# Patient Record
Sex: FEMALE | Race: BLACK OR AFRICAN AMERICAN | NOT HISPANIC OR LATINO | Employment: FULL TIME | URBAN - METROPOLITAN AREA
[De-identification: names, ages, dates, MRNs, and addresses within clinical notes are randomized per-mention and may not be internally consistent; named-entity substitution may affect disease eponyms.]

---

## 2017-05-22 ENCOUNTER — HOSPITAL ENCOUNTER (EMERGENCY)
Facility: HOSPITAL | Age: 32
Discharge: HOME | End: 2017-05-22
Attending: EMERGENCY MEDICINE
Payer: MEDICAID

## 2017-05-22 VITALS
RESPIRATION RATE: 15 BRPM | DIASTOLIC BLOOD PRESSURE: 65 MMHG | OXYGEN SATURATION: 97 % | HEIGHT: 65 IN | WEIGHT: 200 LBS | BODY MASS INDEX: 33.32 KG/M2 | SYSTOLIC BLOOD PRESSURE: 119 MMHG | HEART RATE: 62 BPM | TEMPERATURE: 98 F

## 2017-05-22 DIAGNOSIS — R07.9 CHEST PAIN: ICD-10-CM

## 2017-05-22 DIAGNOSIS — D25.9 UTERINE LEIOMYOMA, UNSPECIFIED LOCATION: Primary | ICD-10-CM

## 2017-05-22 DIAGNOSIS — R10.32 LLQ PAIN: ICD-10-CM

## 2017-05-22 DIAGNOSIS — R10.9 ABDOMINAL CRAMPING: ICD-10-CM

## 2017-05-22 LAB
B-HCG UR QL: NEGATIVE
BACTERIA GENITAL QL WET PREP: ABNORMAL
BASOPHILS # BLD AUTO: 0.02 K/UL
BASOPHILS NFR BLD: 0.2 %
BILIRUB UR QL STRIP: NEGATIVE
CLARITY UR: CLEAR
CLUE CELLS VAG QL WET PREP: ABNORMAL
COLOR UR: ABNORMAL
CTP QC/QA: YES
DIFFERENTIAL METHOD: ABNORMAL
EOSINOPHIL # BLD AUTO: 0.1 K/UL
EOSINOPHIL NFR BLD: 1.2 %
ERYTHROCYTE [DISTWIDTH] IN BLOOD BY AUTOMATED COUNT: 13.6 %
FILAMENT FUNGI VAG WET PREP-#/AREA: ABNORMAL
GLUCOSE UR QL STRIP: NEGATIVE
HCT VFR BLD AUTO: 35.3 %
HGB BLD-MCNC: 11.6 G/DL
HGB UR QL STRIP: ABNORMAL
KETONES UR QL STRIP: NEGATIVE
LEUKOCYTE ESTERASE UR QL STRIP: NEGATIVE
LYMPHOCYTES # BLD AUTO: 2.3 K/UL
LYMPHOCYTES NFR BLD: 28.2 %
MCH RBC QN AUTO: 27.8 PG
MCHC RBC AUTO-ENTMCNC: 32.9 %
MCV RBC AUTO: 85 FL
MICROSCOPIC COMMENT: ABNORMAL
MONOCYTES # BLD AUTO: 0.5 K/UL
MONOCYTES NFR BLD: 6.3 %
NEUTROPHILS # BLD AUTO: 5.2 K/UL
NEUTROPHILS NFR BLD: 64 %
NITRITE UR QL STRIP: NEGATIVE
NON-SQ EPI CELLS #/AREA URNS HPF: 1 /HPF
PH UR STRIP: 7 [PH] (ref 5–8)
PLATELET # BLD AUTO: 310 K/UL
PMV BLD AUTO: 10.1 FL
PROT UR QL STRIP: NEGATIVE
RBC # BLD AUTO: 4.17 M/UL
RBC #/AREA URNS HPF: 20 /HPF (ref 0–4)
SP GR UR STRIP: 1.01 (ref 1–1.03)
SPECIMEN SOURCE: ABNORMAL
SQUAMOUS #/AREA URNS HPF: 1 /HPF
T VAGINALIS GENITAL QL WET PREP: ABNORMAL
URN SPEC COLLECT METH UR: ABNORMAL
UROBILINOGEN UR STRIP-ACNC: NEGATIVE EU/DL
WBC # BLD AUTO: 8.19 K/UL
WBC #/AREA VAG WET PREP: ABNORMAL
YEAST GENITAL QL WET PREP: ABNORMAL

## 2017-05-22 PROCEDURE — 81025 URINE PREGNANCY TEST: CPT | Performed by: EMERGENCY MEDICINE

## 2017-05-22 PROCEDURE — 85025 COMPLETE CBC W/AUTO DIFF WBC: CPT

## 2017-05-22 PROCEDURE — 87210 SMEAR WET MOUNT SALINE/INK: CPT

## 2017-05-22 PROCEDURE — 96372 THER/PROPH/DIAG INJ SC/IM: CPT

## 2017-05-22 PROCEDURE — 63600175 PHARM REV CODE 636 W HCPCS: Performed by: PHYSICIAN ASSISTANT

## 2017-05-22 PROCEDURE — 81000 URINALYSIS NONAUTO W/SCOPE: CPT

## 2017-05-22 PROCEDURE — 99284 EMERGENCY DEPT VISIT MOD MDM: CPT | Mod: 25

## 2017-05-22 PROCEDURE — 87591 N.GONORRHOEAE DNA AMP PROB: CPT

## 2017-05-22 PROCEDURE — 93005 ELECTROCARDIOGRAM TRACING: CPT

## 2017-05-22 RX ORDER — HYDROMORPHONE HYDROCHLORIDE 2 MG/ML
1 INJECTION, SOLUTION INTRAMUSCULAR; INTRAVENOUS; SUBCUTANEOUS
Status: COMPLETED | OUTPATIENT
Start: 2017-05-22 | End: 2017-05-22

## 2017-05-22 RX ORDER — IBUPROFEN 600 MG/1
600 TABLET ORAL EVERY 6 HOURS PRN
Qty: 20 TABLET | Refills: 0 | Status: SHIPPED | OUTPATIENT
Start: 2017-05-22 | End: 2017-05-27

## 2017-05-22 RX ORDER — HYDROMORPHONE HYDROCHLORIDE 2 MG/ML
0.5 INJECTION, SOLUTION INTRAMUSCULAR; INTRAVENOUS; SUBCUTANEOUS
Status: COMPLETED | OUTPATIENT
Start: 2017-05-22 | End: 2017-05-22

## 2017-05-22 RX ADMIN — HYDROMORPHONE HYDROCHLORIDE 1 MG: 2 INJECTION INTRAMUSCULAR; INTRAVENOUS; SUBCUTANEOUS at 05:05

## 2017-05-22 RX ADMIN — HYDROMORPHONE HYDROCHLORIDE 0.5 MG: 2 INJECTION INTRAMUSCULAR; INTRAVENOUS; SUBCUTANEOUS at 06:05
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
## 2017-05-22 NOTE — ED TRIAGE NOTES
"Pt presents with has a history of painful menstrual cramps and having severe lower abd pain.  Pt with history of cysts.  Pt states that been spotting since Thursday night and Friday morning presents with abd pain.  Pt states that tried OTC meds and heating pads and jeanne or no relief.  Pt states that she has used 7 pads since she woke up this morning.  Every time she voids she is "flooding".  Pt states decreased appetite and unable to sleep.Pt rates as 10.  "

## 2017-05-23 LAB
C TRACH DNA SPEC QL NAA+PROBE: NOT DETECTED
N GONORRHOEA DNA SPEC QL NAA+PROBE: NOT DETECTED

## 2019-08-08 ENCOUNTER — HOSPITAL ENCOUNTER (EMERGENCY)
Facility: HOSPITAL | Age: 34
Discharge: HOME | End: 2019-08-08
Attending: EMERGENCY MEDICINE

## 2019-08-08 VITALS
SYSTOLIC BLOOD PRESSURE: 129 MMHG | BODY MASS INDEX: 36.32 KG/M2 | HEIGHT: 65 IN | RESPIRATION RATE: 18 BRPM | WEIGHT: 218 LBS | DIASTOLIC BLOOD PRESSURE: 76 MMHG | TEMPERATURE: 98 F | OXYGEN SATURATION: 100 % | HEART RATE: 71 BPM

## 2019-08-08 DIAGNOSIS — R09.82 POSTNASAL DRIP: ICD-10-CM

## 2019-08-08 DIAGNOSIS — H00.015 HORDEOLUM EXTERNUM OF LEFT LOWER EYELID: Primary | ICD-10-CM

## 2019-08-08 LAB
B-HCG UR QL: NEGATIVE
CTP QC/QA: YES

## 2019-08-08 PROCEDURE — 99284 EMERGENCY DEPT VISIT MOD MDM: CPT | Mod: ER

## 2019-08-08 PROCEDURE — 81025 URINE PREGNANCY TEST: CPT | Mod: ER | Performed by: EMERGENCY MEDICINE

## 2019-08-08 RX ORDER — ERYTHROMYCIN 5 MG/G
OINTMENT OPHTHALMIC
Qty: 1 TUBE | Refills: 0 | Status: SHIPPED | OUTPATIENT
Start: 2019-08-08

## 2019-08-08 RX ORDER — FLUTICASONE PROPIONATE 50 MCG
1 SPRAY, SUSPENSION (ML) NASAL 2 TIMES DAILY
Qty: 1 BOTTLE | Refills: 0 | Status: SHIPPED | OUTPATIENT
Start: 2019-08-08
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
## 2022-12-07 ENCOUNTER — HOSPITAL ENCOUNTER (EMERGENCY)
Facility: HOSPITAL | Age: 37
Discharge: HOME | End: 2022-12-07
Attending: EMERGENCY MEDICINE
Payer: MEDICAID

## 2022-12-07 VITALS
HEART RATE: 80 BPM | OXYGEN SATURATION: 99 % | RESPIRATION RATE: 18 BRPM | TEMPERATURE: 98 F | WEIGHT: 219 LBS | DIASTOLIC BLOOD PRESSURE: 78 MMHG | SYSTOLIC BLOOD PRESSURE: 138 MMHG | HEIGHT: 65 IN | BODY MASS INDEX: 36.49 KG/M2

## 2022-12-07 DIAGNOSIS — L03.012 PARONYCHIA OF LEFT INDEX FINGER: Primary | ICD-10-CM

## 2022-12-07 PROCEDURE — 10060 I&D ABSCESS SIMPLE/SINGLE: CPT | Mod: ER

## 2022-12-07 PROCEDURE — 99284 EMERGENCY DEPT VISIT MOD MDM: CPT | Mod: ER

## 2022-12-07 PROCEDURE — 25000003 PHARM REV CODE 250: Mod: ER | Performed by: EMERGENCY MEDICINE

## 2022-12-07 RX ORDER — LIDOCAINE HYDROCHLORIDE 10 MG/ML
5 INJECTION INFILTRATION; PERINEURAL
Status: COMPLETED | OUTPATIENT
Start: 2022-12-07 | End: 2022-12-07

## 2022-12-07 RX ORDER — ACETAMINOPHEN 500 MG
1000 TABLET ORAL
Status: COMPLETED | OUTPATIENT
Start: 2022-12-07 | End: 2022-12-07

## 2022-12-07 RX ORDER — CEPHALEXIN 500 MG/1
500 CAPSULE ORAL 4 TIMES DAILY
Qty: 20 CAPSULE | Refills: 0 | Status: SHIPPED | OUTPATIENT
Start: 2022-12-07 | End: 2022-12-12

## 2022-12-07 RX ADMIN — ACETAMINOPHEN 1000 MG: 500 TABLET ORAL at 01:12

## 2022-12-07 RX ADMIN — LIDOCAINE HYDROCHLORIDE 5 ML: 10 INJECTION, SOLUTION INFILTRATION; PERINEURAL at 12:12
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
## 2022-12-07 NOTE — ED TRIAGE NOTES
Patient presents to Ed for redness to 2nd digit on right hand after artifical nail pulled up 2 days ago. Possible drainage noted underneath nail bed.  Patient reports pain and redness increasing with swelling.

## 2023-01-12 ENCOUNTER — HOSPITAL ENCOUNTER (EMERGENCY)
Facility: HOSPITAL | Age: 38
Discharge: HOME | End: 2023-01-12
Attending: INTERNAL MEDICINE
Payer: MEDICAID

## 2023-01-12 VITALS
BODY MASS INDEX: 36.61 KG/M2 | OXYGEN SATURATION: 100 % | SYSTOLIC BLOOD PRESSURE: 144 MMHG | WEIGHT: 220 LBS | DIASTOLIC BLOOD PRESSURE: 85 MMHG | TEMPERATURE: 98 F | HEART RATE: 78 BPM | RESPIRATION RATE: 18 BRPM

## 2023-01-12 DIAGNOSIS — M25.572 LEFT ANKLE PAIN: ICD-10-CM

## 2023-01-12 PROCEDURE — 25000003 PHARM REV CODE 250: Mod: ER | Performed by: INTERNAL MEDICINE

## 2023-01-12 PROCEDURE — 99283 EMERGENCY DEPT VISIT LOW MDM: CPT | Mod: ER

## 2023-01-12 RX ORDER — IBUPROFEN 400 MG/1
800 TABLET ORAL
Status: COMPLETED | OUTPATIENT
Start: 2023-01-12 | End: 2023-01-12

## 2023-01-12 RX ORDER — IBUPROFEN 800 MG/1
800 TABLET ORAL EVERY 8 HOURS PRN
Qty: 30 TABLET | Refills: 0 | Status: SHIPPED | OUTPATIENT
Start: 2023-01-12

## 2023-01-12 RX ADMIN — IBUPROFEN 800 MG: 400 TABLET ORAL at 01:01

## 2023-01-12 NOTE — Clinical Note
"Effie Malhotramikki Tillman was seen and treated in our emergency department on 1/12/2023.  She may return to work on 01/16/2023.       If you have any questions or concerns, please don't hesitate to call.       RN    "
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
## 2023-03-12 ENCOUNTER — HOSPITAL ENCOUNTER (EMERGENCY)
Facility: HOSPITAL | Age: 38
Discharge: HOME | End: 2023-03-12
Attending: EMERGENCY MEDICINE
Payer: MEDICAID

## 2023-03-12 VITALS
RESPIRATION RATE: 18 BRPM | WEIGHT: 220 LBS | DIASTOLIC BLOOD PRESSURE: 89 MMHG | HEART RATE: 79 BPM | BODY MASS INDEX: 36.61 KG/M2 | SYSTOLIC BLOOD PRESSURE: 142 MMHG | TEMPERATURE: 99 F | OXYGEN SATURATION: 100 %

## 2023-03-12 DIAGNOSIS — R11.11 VOMITING WITHOUT NAUSEA, UNSPECIFIED VOMITING TYPE: ICD-10-CM

## 2023-03-12 DIAGNOSIS — R09.A2 GLOBUS SENSATION: ICD-10-CM

## 2023-03-12 DIAGNOSIS — K30 MILD DIETARY INDIGESTION: Primary | ICD-10-CM

## 2023-03-12 DIAGNOSIS — J02.9 SORE THROAT: ICD-10-CM

## 2023-03-12 LAB
CTP QC/QA: YES
MOLECULAR STREP A: NEGATIVE

## 2023-03-12 PROCEDURE — 87651 STREP A DNA AMP PROBE: CPT

## 2023-03-12 PROCEDURE — 99284 EMERGENCY DEPT VISIT MOD MDM: CPT

## 2023-03-12 PROCEDURE — 25000003 PHARM REV CODE 250

## 2023-03-12 RX ORDER — IBUPROFEN 600 MG/1
600 TABLET ORAL
Status: COMPLETED | OUTPATIENT
Start: 2023-03-12 | End: 2023-03-12

## 2023-03-12 RX ORDER — LIDOCAINE HYDROCHLORIDE 20 MG/ML
5 SOLUTION OROPHARYNGEAL
Status: COMPLETED | OUTPATIENT
Start: 2023-03-12 | End: 2023-03-12

## 2023-03-12 RX ORDER — FAMOTIDINE 20 MG/1
20 TABLET, FILM COATED ORAL 2 TIMES DAILY PRN
Qty: 20 TABLET | Refills: 0 | OUTPATIENT
Start: 2023-03-12 | End: 2023-10-04

## 2023-03-12 RX ORDER — ONDANSETRON 4 MG/1
4 TABLET, ORALLY DISINTEGRATING ORAL EVERY 6 HOURS PRN
Qty: 15 TABLET | Refills: 0 | Status: SHIPPED | OUTPATIENT
Start: 2023-03-12

## 2023-03-12 RX ORDER — SUCRALFATE 1 G/10ML
1 SUSPENSION ORAL 4 TIMES DAILY PRN
Qty: 414 ML | Refills: 0 | Status: SHIPPED | OUTPATIENT
Start: 2023-03-12

## 2023-03-12 RX ADMIN — IBUPROFEN 600 MG: 600 TABLET ORAL at 09:03

## 2023-03-12 RX ADMIN — LIDOCAINE HYDROCHLORIDE 5 ML: 20 SOLUTION ORAL at 09:03

## 2023-03-12 NOTE — ED PROVIDER NOTES
"Encounter Date: 3/12/2023    SCRIBE #1 NOTE: I, JARRED HUERTA, am scribing for, and in the presence of,  Alayna Holdsworth, PA-C. I have scribed the following portions of the note - Other sections scribed: HPI, ROS.     History     Chief Complaint   Patient presents with    Sore Throat     Began with vomiting at 0630 this am, now feels like something is stuck in her throat, sensitive to the touch; vomiting relieved but pain in throat is becoming more intense     37-year-old female with a PMHx of ovarian cyst, presents to the ED with a chief complaint of sore throat onset this morning. Patient states that she woke up with constant pain in her throat that worsens with swallowing or breathing. She further states that it feels like something is "lodged in there". Denies taking any OTC medications to alleviate pain. She states that she had "trapped gas" last night after eating red beans and took one dose of Gas-X. She reports having one episode of emesis for three to four minutes and nausea last night that has now resolved. Denies any recent sick contact. She reports a PSHx of hysterectomy. No other exacerbating or alleviating factors. Denies any cough, rhinorrhea, congestion, headache, abdominal pain, nausea, vomiting, diarrhea, constipation, chest pain, shortness of breath, dysuria, frequency, decreased urine, fever, dizziness, lightheadedness, or other associated symptoms.      The history is provided by the patient. No  was used.   Review of patient's allergies indicates:  No Known Allergies  Past Medical History:   Diagnosis Date    Electrical injuries Jan 19 2013    R arm.  Right ring finger    Ovarian cyst      Past Surgical History:   Procedure Laterality Date    TUBAL LIGATION      TUBAL LIGATION       Family History   Problem Relation Age of Onset    Breast cancer Neg Hx     Colon cancer Neg Hx     Ovarian cancer Neg Hx      Social History     Tobacco Use    Smoking status: Never    Smokeless " tobacco: Never   Substance Use Topics    Alcohol use: Yes     Comment: occassionally    Drug use: No     Review of Systems   Constitutional:  Negative for chills, fatigue and fever.   HENT:  Positive for sore throat. Negative for congestion, ear discharge, ear pain, rhinorrhea, trouble swallowing and voice change.    Respiratory:  Negative for cough, choking and shortness of breath.    Cardiovascular:  Negative for chest pain, palpitations and leg swelling.   Gastrointestinal:  Positive for vomiting (x1). Negative for abdominal pain, constipation, diarrhea and nausea.   Genitourinary:  Negative for decreased urine volume, dysuria, frequency, hematuria and urgency.   Musculoskeletal:  Negative for arthralgias and myalgias.   Skin:  Negative for rash.   Neurological:  Negative for dizziness, weakness, light-headedness and headaches.     Physical Exam     Initial Vitals   BP Pulse Resp Temp SpO2   03/12/23 0910 03/12/23 0910 03/12/23 0910 03/12/23 0909 03/12/23 0910   (!) 142/89 79 18 98.5 °F (36.9 °C) 100 %      MAP       --                Physical Exam    Nursing note and vitals reviewed.  Constitutional: She appears well-developed and well-nourished. She is not diaphoretic. She is active. She does not appear ill. No distress.   HENT:   Head: Normocephalic and atraumatic.   Right Ear: External ear normal.   Left Ear: External ear normal.   Nose: Nose normal.   Mouth/Throat: Uvula is midline and mucous membranes are normal. Posterior oropharyngeal erythema present. No oropharyngeal exudate, posterior oropharyngeal edema or tonsillar abscesses.   Eyes: Conjunctivae, EOM and lids are normal. Pupils are equal, round, and reactive to light. Right eye exhibits no discharge. Left eye exhibits no discharge.   Neck: Phonation normal. Neck supple.   Normal range of motion.   Full passive range of motion without pain.     Cardiovascular:  Normal rate and regular rhythm.           Pulmonary/Chest: Effort normal and breath sounds  normal. No respiratory distress.   Abdominal: She exhibits no distension.   Musculoskeletal:         General: Normal range of motion.      Cervical back: Full passive range of motion without pain, normal range of motion and neck supple. No edema, erythema or rigidity. No spinous process tenderness or muscular tenderness. Normal range of motion.     Neurological: She is alert and oriented to person, place, and time. GCS eye subscore is 4. GCS verbal subscore is 5. GCS motor subscore is 6.   Skin: Skin is dry. Capillary refill takes less than 2 seconds.       ED Course   Procedures  Labs Reviewed   POCT STREP A MOLECULAR          Imaging Results              X-Ray Neck Soft Tissue (Final result)  Result time 03/12/23 10:01:00      Final result by Bryce Mayberry MD (03/12/23 10:01:00)                   Impression:      No acute findings.  Further evaluation could be performed with CT, if indicated.      Electronically signed by: Bryce Mayberry MD  Date:    03/12/2023  Time:    10:01               Narrative:    EXAMINATION:  XR NECK SOFT TISSUE    CLINICAL HISTORY:  Acute pharyngitis, unspecified    TECHNIQUE:  AP and lateral soft tissue views the neck were performed.    COMPARISON:  None.    FINDINGS:  No radiopaque foreign bodies.  No prevertebral soft tissue swelling.  C5-6 degenerative disc disease, noting mild disc height loss with endplate changes.  No gross enlargement of tonsillar tissues or epiglottis.                                       Medications   ibuprofen tablet 600 mg (600 mg Oral Given 3/12/23 0951)   LIDOcaine HCl 2% oral solution 5 mL (5 mLs Oral Given 3/12/23 0930)     Medical Decision Making:   History:   Old Medical Records: I decided to obtain old medical records.  Initial Assessment:   37-year-old female with a PMHx of ovarian cyst, presents to the ED with a chief complaint of sore throat.   Patient's chart and medical history reviewed.  Differential Diagnosis:   Strep pharyngitis  Viral  pharyngitis  Nahid's angina  Retropharyngeal abscess  Tonsillar abscess  GERD  Globus   Clinical Tests:   Lab Tests: Reviewed and Ordered  Radiological Study: Ordered and Reviewed  ED Management:  Patient's vitals reviewed.  She is afebrile, no respiratory distress, nontoxic-appearing in the ED. patient had erythematous throat on exam, otherwise unremarkable.  Patient given Motrin and viscous lidocaine for pain.  Soft tissue neck x-ray showed no acute findings per my personal interpretation.  Official x-ray reading showed no acute abnormalities.  Patient is strep negative.  Discussed with patient this is most likely due to ingestion from her eating late last night causing the vomiting and now she is having a globus sensation in her throat post vomit.  Instructed patient to rest and stay well hydrated.  Patient will be sent home with Zofran, sucralfate, benzocaine throat lozenges, and famotidine for symptomatic control. Patient agrees with this plan. Discussed with her strict return precautions, she verbalized understanding. Patient is stable for discharge.         Scribe Attestation:   Scribe #1: I performed the above scribed service and the documentation accurately describes the services I performed. I attest to the accuracy of the note.                 Scribe attestation: I, Alayna Holdsworth,PA-C, personally performed the services described in this documentation. All medical record entries made by the scribe were at my direction and in my presence.  I have reviewed the chart and agree that the record reflects my personal performance and is accurate and complete.   Clinical Impression:   Final diagnoses:  [J02.9] Sore throat  [R11.11] Vomiting without nausea, unspecified vomiting type  [R09.89] Globus sensation  [K30] Mild dietary indigestion (Primary)        ED Disposition Condition    Discharge Stable          ED Prescriptions       Medication Sig Dispense Start Date End Date Auth. Provider    famotidine  (PEPCID) 20 MG tablet Take 1 tablet (20 mg total) by mouth 2 (two) times daily as needed for Heartburn. 20 tablet 3/12/2023 -- Alayna Holdsworth, PA-C    sucralfate (CARAFATE) 100 mg/mL suspension Take 10 mLs (1 g total) by mouth 4 (four) times daily as needed (heart burn). 414 mL 3/12/2023 -- Alayna Holdsworth, PA-C    ondansetron (ZOFRAN-ODT) 4 MG TbDL Take 1 tablet (4 mg total) by mouth every 6 (six) hours as needed (Nausea). 15 tablet 3/12/2023 -- Alayna Holdsworth, PA-C    benzocaine-menthoL 6-10 mg lozenge Take 1 lozenge by mouth every 2 (two) hours as needed for Pain (sore throat). 18 tablet 3/12/2023 -- Alayna Holdsworth, PA-C          Follow-up Information       Follow up With Specialties Details Why Contact [facility] - Emergency Dept Emergency Medicine  If symptoms worsen [address] [facility] [address] [phone]             Alayna Holdsworth, PA-C  03/12/23 1018

## 2023-03-12 NOTE — DISCHARGE INSTRUCTIONS
Thank you for coming to our Emergency Department today. It is important to remember that some problems are difficult to diagnose and may not be found during your first visit. Be sure to follow up with your primary care doctor and review any labs/imaging that was performed with them. If you do not have a primary care doctor, you may contact the one listed on your discharge paperwork or you may also call the [facility] Appointment Desk at [phone] to schedule an appointment with one.     All medications may potentially have side effects and it is impossible to predict which medications may give you side effects. If you feel that you are having a negative effect of any medication you should immediately stop taking them and seek medical attention.    Return to the ER with any questions/concerns, new/concerning symptoms, worsening or failure to improve. Do not drive or make any important decisions for 24 hours if you have received any pain medications, sedatives or mood altering drugs during your ER visit.

## 2023-10-04 ENCOUNTER — HOSPITAL ENCOUNTER (EMERGENCY)
Facility: HOSPITAL | Age: 38
Discharge: HOME | End: 2023-10-04
Attending: EMERGENCY MEDICINE
Payer: MEDICAID

## 2023-10-04 VITALS
HEART RATE: 78 BPM | SYSTOLIC BLOOD PRESSURE: 133 MMHG | RESPIRATION RATE: 16 BRPM | WEIGHT: 220 LBS | OXYGEN SATURATION: 99 % | DIASTOLIC BLOOD PRESSURE: 71 MMHG | BODY MASS INDEX: 36.61 KG/M2 | TEMPERATURE: 98 F

## 2023-10-04 DIAGNOSIS — K21.9 GASTROESOPHAGEAL REFLUX DISEASE, UNSPECIFIED WHETHER ESOPHAGITIS PRESENT: Primary | ICD-10-CM

## 2023-10-04 LAB
ALBUMIN SERPL-MCNC: 4 G/DL (ref 3.3–5.5)
ALBUMIN SERPL-MCNC: 4.3 G/DL (ref 3.3–5.5)
ALP SERPL-CCNC: 54 U/L (ref 42–141)
ALP SERPL-CCNC: 56 U/L (ref 42–141)
BILIRUB SERPL-MCNC: 0.6 MG/DL (ref 0.2–1.6)
BILIRUB SERPL-MCNC: 0.6 MG/DL (ref 0.2–1.6)
BILIRUBIN, POC UA: NEGATIVE
BLOOD, POC UA: NEGATIVE
BUN SERPL-MCNC: 12 MG/DL (ref 7–22)
CALCIUM SERPL-MCNC: 9.6 MG/DL (ref 8–10.3)
CHLORIDE SERPL-SCNC: 108 MMOL/L (ref 98–108)
CLARITY, POC UA: CLEAR
COLOR, POC UA: YELLOW
CREAT SERPL-MCNC: 0.8 MG/DL (ref 0.6–1.2)
GLUCOSE SERPL-MCNC: 96 MG/DL (ref 73–118)
GLUCOSE, POC UA: NEGATIVE
KETONES, POC UA: NEGATIVE
LEUKOCYTE EST, POC UA: ABNORMAL
NITRITE, POC UA: NEGATIVE
PH UR STRIP: 6.5 [PH]
POC ALT (SGPT): 23 U/L (ref 10–47)
POC ALT (SGPT): 23 U/L (ref 10–47)
POC AMYLASE: 49 U/L (ref 14–97)
POC AST (SGOT): 24 U/L (ref 11–38)
POC AST (SGOT): 26 U/L (ref 11–38)
POC GGT: 65 U/L (ref 5–65)
POC TCO2: 29 MMOL/L (ref 18–33)
POTASSIUM BLD-SCNC: 4.1 MMOL/L (ref 3.6–5.1)
PROTEIN, POC UA: NEGATIVE
PROTEIN, POC: 7.8 G/DL (ref 6.4–8.1)
PROTEIN, POC: 8.1 G/DL (ref 6.4–8.1)
SODIUM BLD-SCNC: 146 MMOL/L (ref 128–145)
SPECIFIC GRAVITY, POC UA: >=1.03
UROBILINOGEN, POC UA: 0.2 E.U./DL

## 2023-10-04 PROCEDURE — 25000003 PHARM REV CODE 250: Mod: ER | Performed by: STUDENT IN AN ORGANIZED HEALTH CARE EDUCATION/TRAINING PROGRAM

## 2023-10-04 PROCEDURE — 87086 URINE CULTURE/COLONY COUNT: CPT | Performed by: EMERGENCY MEDICINE

## 2023-10-04 PROCEDURE — 99284 EMERGENCY DEPT VISIT MOD MDM: CPT | Mod: 25,ER

## 2023-10-04 PROCEDURE — 82040 ASSAY OF SERUM ALBUMIN: CPT | Mod: 59,ER

## 2023-10-04 PROCEDURE — 96374 THER/PROPH/DIAG INJ IV PUSH: CPT | Mod: ER

## 2023-10-04 PROCEDURE — 80053 COMPREHEN METABOLIC PANEL: CPT | Mod: ER

## 2023-10-04 PROCEDURE — 82150 ASSAY OF AMYLASE: CPT | Mod: ER

## 2023-10-04 RX ORDER — FAMOTIDINE 20 MG/1
20 TABLET, FILM COATED ORAL 2 TIMES DAILY
Qty: 180 TABLET | Refills: 3 | Status: SHIPPED | OUTPATIENT
Start: 2023-10-04 | End: 2024-10-03

## 2023-10-04 RX ORDER — MAG HYDROX/ALUMINUM HYD/SIMETH 200-200-20
30 SUSPENSION, ORAL (FINAL DOSE FORM) ORAL ONCE
Status: COMPLETED | OUTPATIENT
Start: 2023-10-04 | End: 2023-10-04

## 2023-10-04 RX ORDER — LIDOCAINE HYDROCHLORIDE 20 MG/ML
15 SOLUTION OROPHARYNGEAL ONCE
Status: DISCONTINUED | OUTPATIENT
Start: 2023-10-04 | End: 2023-10-04

## 2023-10-04 RX ORDER — FAMOTIDINE 10 MG/ML
20 INJECTION INTRAVENOUS
Status: COMPLETED | OUTPATIENT
Start: 2023-10-04 | End: 2023-10-04

## 2023-10-04 RX ADMIN — FAMOTIDINE 20 MG: 10 INJECTION INTRAVENOUS at 02:10

## 2023-10-04 RX ADMIN — ALUMINUM HYDROXIDE, MAGNESIUM HYDROXIDE, AND DIMETHICONE 30 ML: 200; 20; 200 SUSPENSION ORAL at 03:10

## 2023-10-04 NOTE — Clinical Note
"Effie "Carlton Tillman was seen and treated in our emergency department on 10/4/2023.  She may return to work on 10/05/2023.       If you have any questions or concerns, please don't hesitate to call.      Satya Cuadra PA-C"

## 2023-10-04 NOTE — ED PROVIDER NOTES
Encounter Date: 10/4/2023       History     Chief Complaint   Patient presents with    Abdominal Pain     Pt presents to the ED with complaint mid upper abd pain and vomiting onset this morning. Pt reports taking milk of magnesium this morning with no relief.     38-year-old female no significant past medical history emergency room today for evaluation midepigastric abdominal pain.  Abdominal pain began day, associated nausea emesis.  Denies fevers or chills.  No constipation or diarrhea.  No hematemesis hematochezia.  Patient tells me the midepigastric burning sensation.  Worse with food intake.        Review of patient's allergies indicates:  No Known Allergies  Past Medical History:   Diagnosis Date    Electrical injuries Jan 19 2013    R arm.  Right ring finger    Ovarian cyst      Past Surgical History:   Procedure Laterality Date    TUBAL LIGATION      TUBAL LIGATION       Family History   Problem Relation Age of Onset    Breast cancer Neg Hx     Colon cancer Neg Hx     Ovarian cancer Neg Hx      Social History     Tobacco Use    Smoking status: Never    Smokeless tobacco: Never   Substance Use Topics    Alcohol use: Yes     Comment: occassionally    Drug use: No     Review of Systems   Constitutional:  Negative for chills, fatigue and fever.   HENT:  Negative for congestion, hearing loss, sore throat and trouble swallowing.    Eyes:  Negative for visual disturbance.   Respiratory:  Negative for cough, chest tightness and shortness of breath.    Cardiovascular:  Negative for chest pain.   Gastrointestinal:  Positive for abdominal pain, nausea and vomiting.   Endocrine: Negative for polyuria.   Genitourinary:  Negative for decreased urine volume, difficulty urinating, dysuria, frequency, genital sores, vaginal bleeding and vaginal discharge.   Musculoskeletal:  Negative for arthralgias and myalgias.   Skin:  Negative for rash.   Neurological:  Negative for dizziness and headaches.   Psychiatric/Behavioral:  The  patient is not nervous/anxious.        Physical Exam     Initial Vitals [10/04/23 1318]   BP Pulse Resp Temp SpO2   (!) 142/81 66 18 98.5 °F (36.9 °C) 100 %      MAP       --         Physical Exam    Nursing note and vitals reviewed.  Constitutional: She appears well-developed and well-nourished.   HENT:   Head: Normocephalic and atraumatic.   Eyes: Conjunctivae are normal. Pupils are equal, round, and reactive to light.   Neck: Neck supple.   Normal range of motion.  Cardiovascular:  Normal rate, regular rhythm and normal heart sounds.           Pulmonary/Chest: Breath sounds normal.   Abdominal:   Exam notable for: mild mid epigastric tenderness, LUQ tenderness    Patient placed supine with flexed knees. Abdomen is otherwise soft and nontender in all four quadrants without distention. Nonperitoneal. No overlying skin changes. No abnormal palpable masses. Normoactive bowel sounds. Distal pulses 2+. No inguinal lymphadenopathy.  No suprapubic pain.     Focused Testing //  (-) Avendaño's sign   (-) Rebound Tenderness   (-) Heel Tap / Bed rock     Musculoskeletal:         General: Normal range of motion.      Cervical back: Normal range of motion and neck supple.     Neurological: She is alert and oriented to person, place, and time. GCS score is 15. GCS eye subscore is 4. GCS verbal subscore is 5. GCS motor subscore is 6.   Skin: Skin is warm and dry. Capillary refill takes less than 2 seconds.   Psychiatric: She has a normal mood and affect. Her behavior is normal. Judgment and thought content normal.         ED Course   Procedures  Labs Reviewed   POCT URINALYSIS W/O SCOPE - Abnormal; Notable for the following components:       Result Value    Spec Grav UA >=1.030 (*)     Leukocytes, UA Trace (*)     All other components within normal limits   POCT CMP - Abnormal; Notable for the following components:    POC Sodium 146 (*)     All other components within normal limits   CULTURE, URINE   POCT URINALYSIS W/O SCOPE    POCT CBC   POCT CMP   POCT LIVER PANEL   POCT LIVER PANEL            Imaging Results    None          Medications   aluminum-magnesium hydroxide-simethicone 200-200-20 mg/5 mL suspension 30 mL (30 mLs Oral Given 10/4/23 1512)   famotidine (PF) injection 20 mg (20 mg Intravenous Given 10/4/23 1449)     Medical Decision Making  38-year-old female presents emergency room today for evaluation of midepigastric abdominal pain.  Patient given Pepcid IV and GI cocktail with complete resolution of her symptoms.  She is nontoxic, well-appearing and in no acute distress.  Labs not significant for significant metabolic abnormality, significant anemia, evidence of infection.  Likely represents GERD.  Low suspicion for acute intra-abdominal pathology necessitating consultation or admission from the emergency depart patient will be discharged home in stable condition.  Strict and strong return precautions were discussed prior to discharge    Amount and/or Complexity of Data Reviewed  Labs: ordered. Decision-making details documented in ED Course.    Risk  OTC drugs.  Prescription drug management.                               Clinical Impression:   Final diagnoses:  [K21.9] Gastroesophageal reflux disease, unspecified whether esophagitis present (Primary)        ED Disposition Condition    Discharge Stable          ED Prescriptions       Medication Sig Dispense Start Date End Date Auth. Provider    famotidine (PEPCID) 20 MG tablet Take 1 tablet (20 mg total) by mouth 2 (two) times daily. 180 tablet 10/4/2023 10/3/2024 Satya Cuadra PA-C          Follow-up Information       Follow up With Specialties Details Why Contact Info    [facility] ED Emergency Medicine Go to  As needed, If symptoms worsen [address] [facility] [id number] [phone]    PCM  Schedule an appointment as soon as possible for a visit in 1 week               Satya Cuadra PA-C  10/04/23 1520

## 2023-10-06 LAB — BACTERIA UR CULT: NORMAL

## 2023-11-14 DIAGNOSIS — Z12.39 ENCOUNTER FOR OTHER SCREENING FOR MALIGNANT NEOPLASM OF BREAST: Primary | ICD-10-CM

## 2023-11-16 ENCOUNTER — HOSPITAL ENCOUNTER (OUTPATIENT)
Dept: RADIOLOGY | Facility: HOSPITAL | Age: 38
Discharge: HOME | End: 2023-11-16
Payer: MEDICAID

## 2023-11-16 DIAGNOSIS — Z12.39 ENCOUNTER FOR OTHER SCREENING FOR MALIGNANT NEOPLASM OF BREAST: ICD-10-CM

## 2023-11-16 PROCEDURE — 77067 SCR MAMMO BI INCL CAD: CPT | Mod: 26,,, | Performed by: RADIOLOGY

## 2023-11-16 PROCEDURE — 77063 MAMMO DIGITAL SCREENING BILAT WITH TOMO: ICD-10-PCS | Mod: 26,,, | Performed by: RADIOLOGY

## 2023-11-16 PROCEDURE — 77067 SCR MAMMO BI INCL CAD: CPT | Mod: TC

## 2023-11-16 PROCEDURE — 77067 MAMMO DIGITAL SCREENING BILAT WITH TOMO: ICD-10-PCS | Mod: 26,,, | Performed by: RADIOLOGY

## 2023-11-16 PROCEDURE — 77063 BREAST TOMOSYNTHESIS BI: CPT | Mod: 26,,, | Performed by: RADIOLOGY

## 2024-09-01 ENCOUNTER — HOSPITAL ENCOUNTER (EMERGENCY)
Facility: HOSPITAL | Age: 39
Discharge: HOME | End: 2024-09-01
Attending: EMERGENCY MEDICINE
Payer: MEDICAID

## 2024-09-01 VITALS
HEIGHT: 65 IN | OXYGEN SATURATION: 97 % | SYSTOLIC BLOOD PRESSURE: 121 MMHG | RESPIRATION RATE: 18 BRPM | DIASTOLIC BLOOD PRESSURE: 83 MMHG | HEART RATE: 74 BPM | BODY MASS INDEX: 35.49 KG/M2 | WEIGHT: 213 LBS | TEMPERATURE: 98 F

## 2024-09-01 DIAGNOSIS — L50.9 URTICARIA: Primary | ICD-10-CM

## 2024-09-01 PROCEDURE — 63600175 PHARM REV CODE 636 W HCPCS: Mod: ER

## 2024-09-01 PROCEDURE — 25000003 PHARM REV CODE 250: Mod: ER

## 2024-09-01 PROCEDURE — 99284 EMERGENCY DEPT VISIT MOD MDM: CPT | Mod: ER

## 2024-09-01 RX ORDER — HYDROXYZINE HYDROCHLORIDE 25 MG/1
50 TABLET, FILM COATED ORAL EVERY 6 HOURS
Qty: 30 TABLET | Refills: 0 | Status: SHIPPED | OUTPATIENT
Start: 2024-09-01

## 2024-09-01 RX ORDER — FAMOTIDINE 20 MG/1
40 TABLET, FILM COATED ORAL
Status: COMPLETED | OUTPATIENT
Start: 2024-09-01 | End: 2024-09-01

## 2024-09-01 RX ORDER — PREDNISONE 20 MG/1
60 TABLET ORAL
Status: COMPLETED | OUTPATIENT
Start: 2024-09-01 | End: 2024-09-01

## 2024-09-01 RX ORDER — PREDNISONE 20 MG/1
20 TABLET ORAL DAILY
Qty: 3 TABLET | Refills: 0 | Status: SHIPPED | OUTPATIENT
Start: 2024-09-01 | End: 2024-09-04

## 2024-09-01 RX ORDER — FAMOTIDINE 20 MG/1
20 TABLET, FILM COATED ORAL 2 TIMES DAILY
Qty: 20 TABLET | Refills: 0 | Status: SHIPPED | OUTPATIENT
Start: 2024-09-01 | End: 2025-09-01

## 2024-09-01 RX ADMIN — FAMOTIDINE 40 MG: 20 TABLET, FILM COATED ORAL at 10:09

## 2024-09-01 RX ADMIN — PREDNISONE 60 MG: 20 TABLET ORAL at 10:09

## 2024-09-02 NOTE — ED PROVIDER NOTES
"Encounter Date: 9/1/2024       History     Chief Complaint   Patient presents with    Allergic Reaction     PT REPORTS ATE [employer] ON THURSDAY AND HAS RASH EVERYWHERE GETTING WORSE     Effie Tillman is a 39-year-old female with no pertinent past medical history presenting to the emergency department with a chief complaint of allergic reaction.  She was allergic to corn and believes that there was corn or corn products in her food that she ate from [employer] 3 days ago.  Since that time, she has had intermittent rash.  Described as "hives and welts".  Very itchy, minimally painful.  She has been scratching at them a lot.  No medications at home to attempt to alleviate symptoms.  Denies shortness of breath, dyspnea, chest tightness, nausea, vomiting, difficulty breathing, difficulty swallowing, facial swelling, lip swelling, tongue swelling, or any other symptoms.  Follows with Allergy and immunology as an outpatient.    The history is provided by the patient. No  was used.     Review of patient's allergies indicates:  No Known Allergies  Past Medical History:   Diagnosis Date    Electrical injuries Jan 19 2013    R arm.  Right ring finger    Ovarian cyst      Past Surgical History:   Procedure Laterality Date    HYSTERECTOMY      TUBAL LIGATION      TUBAL LIGATION       Family History   Problem Relation Name Age of Onset    Breast cancer Neg Hx      Colon cancer Neg Hx      Ovarian cancer Neg Hx       Social History     Tobacco Use    Smoking status: Never    Smokeless tobacco: Never   Substance Use Topics    Alcohol use: Yes     Comment: occassionally    Drug use: No     Review of Systems   Constitutional:  Negative for chills and fever.   HENT:  Negative for rhinorrhea and sore throat.    Respiratory:  Negative for chest tightness, shortness of breath, wheezing and stridor.    Cardiovascular:  Negative for chest pain and palpitations.   Gastrointestinal:  Negative for abdominal pain, nausea " and vomiting.   Genitourinary:  Negative for dysuria.   Musculoskeletal:  Negative for back pain.   Skin:  Positive for rash.   Neurological:  Negative for weakness and headaches.   Hematological:  Does not bruise/bleed easily.       Physical Exam     Initial Vitals [09/01/24 2156]   BP Pulse Resp Temp SpO2   125/83 70 20 98.2 °F (36.8 °C) 96 %      MAP       --         Physical Exam    Nursing note and vitals reviewed.  Constitutional: Vital signs are normal. She appears well-developed and well-nourished. She is cooperative. She does not appear ill. No distress.   Well-appearing.  No acute distress.   HENT:   Head: Normocephalic and atraumatic.   Right Ear: Hearing and external ear normal.   Left Ear: Hearing and external ear normal.   Nose: Nose normal.   No face, lip, or tongue swelling.  Patient was able to swallow and is managing secretions appropriately.  Airway is patent.   Eyes: Conjunctivae and EOM are normal.   Neck: Phonation normal.   Normal range of motion.  Cardiovascular:  Normal rate and regular rhythm.           No murmur heard.  Regular rate and rhythm.   Pulmonary/Chest: Effort normal. No respiratory distress.   Respirations even and unlabored.  No adventitious sounds of breathing.  Good air movement.  No wheezing.  No stridor.   Abdominal: Abdomen is soft. She exhibits no distension. There is no abdominal tenderness.   Musculoskeletal:      Cervical back: Normal range of motion.     Neurological: She is alert and oriented to person, place, and time. GCS eye subscore is 4. GCS verbal subscore is 5. GCS motor subscore is 6.   Skin: Skin is warm. Capillary refill takes less than 2 seconds.   A few patches of urticarial rash, specifically on the right proximal forearm, the left side of the low jaw, and over the right scapula.  A few very superficial excoriations overlying the patch on the scapula.         ED Course   Procedures  Labs Reviewed - No data to display       Imaging Results    None           Medications   famotidine tablet 40 mg (has no administration in time range)   predniSONE tablet 60 mg (has no administration in time range)     Medical Decision Making  39-year-old female presenting to the emergency department with a 3 day history of allergic reaction rash after eating food from [employer].  Rash is itchy but minimally painful.  Denies any other symptoms.  On exam, she was well-appearing and in no acute distress.  Vitals are within acceptable ranges.  No facial or oropharyngeal swelling.  Airway patent.  Patient able to swallow.  Cardiac and lung exams within normal limits.  Patchy urticarial rash.    Differential diagnosis is broad and includes but is not limited to allergic, atopic, or contact dermatitis, cellulitis, erysipelas, monkey pox, small pox, chickenpox, tinea, psoriasis, burn, impetigo, drug rash, toxic epidermal necrolysis, Santos-Grzegorz syndrome, melanoma, actinic keratosis, seborrheic keratosis, allergic reaction, dermatitis herpetiformis     Presentation is consistent with allergic reaction.  Unclear if this is due to patient eating at [employer] or another environmental trigger.  Presentation is not consistent with anaphylaxis given time course, involvement of only skin.  Acute management in the emergency department with famotidine and prednisone.  Short course of prednisone, Atarax, famotidine electronically prescribed and sent to the patient's preferred pharmacy.  Stable for discharge home to outpatient follow up.  Urged her to make a follow-up appointment with her allergist.    Return precautions were discussed, all patient questions were answered, and the patient was agreeable to the plan of care.  She was discharged home in stable condition and will follow up with her primary care provider or return to the emergency department if her symptoms worsen or do not improve.     Risk  Prescription drug management.                                      Clinical Impression:  Final  diagnoses:  [L50.9] Urticaria (Primary)          ED Disposition Condition    Discharge Stable          ED Prescriptions       Medication Sig Dispense Start Date End Date Auth. Provider    hydrOXYzine HCL (ATARAX) 25 MG tablet Take 2 tablets (50 mg total) by mouth every 6 (six) hours. 30 tablet 9/1/2024 -- Chris Braswell PA-C    famotidine (PEPCID) 20 MG tablet Take 1 tablet (20 mg total) by mouth 2 (two) times daily. 20 tablet 9/1/2024 9/1/2025 Chris Braswell PA-C    predniSONE (DELTASONE) 20 MG tablet Take 1 tablet (20 mg total) by mouth once daily. for 3 days 3 tablet 9/1/2024 9/4/2024 Chris Braswell PA-C          Follow-up Information       Follow up With Specialties Details Why Contact Info    [facility] -  Schedule an appointment as soon as possible for a visit  As needed, If symptoms worsen [address]  [place] [address]  [phone]               Chris Braswell PA-C  09/01/24 4513

## 2024-09-02 NOTE — DISCHARGE INSTRUCTIONS

## 2024-10-08 ENCOUNTER — ANESTHESIA EVENT (OUTPATIENT)
Dept: ENDOSCOPY | Facility: HOSPITAL | Age: 39
End: 2024-10-08
Payer: MEDICAID

## 2024-10-08 ENCOUNTER — HOSPITAL ENCOUNTER (OUTPATIENT)
Facility: HOSPITAL | Age: 39
Discharge: HOME | End: 2024-10-11
Attending: EMERGENCY MEDICINE | Admitting: EMERGENCY MEDICINE
Payer: MEDICAID

## 2024-10-08 ENCOUNTER — ANESTHESIA (OUTPATIENT)
Dept: ENDOSCOPY | Facility: HOSPITAL | Age: 39
End: 2024-10-08
Payer: MEDICAID

## 2024-10-08 DIAGNOSIS — Z90.49 STATUS POST LAPAROSCOPIC CHOLECYSTECTOMY: ICD-10-CM

## 2024-10-08 DIAGNOSIS — R10.13 EPIGASTRIC PAIN: ICD-10-CM

## 2024-10-08 DIAGNOSIS — R00.1 BRADYCARDIA: ICD-10-CM

## 2024-10-08 DIAGNOSIS — K80.50 BILIARY COLIC: Primary | ICD-10-CM

## 2024-10-08 DIAGNOSIS — R10.11 RUQ PAIN: ICD-10-CM

## 2024-10-08 DIAGNOSIS — R11.10 INTRACTABLE VOMITING: ICD-10-CM

## 2024-10-08 DIAGNOSIS — R11.2 INTRACTABLE NAUSEA AND VOMITING: ICD-10-CM

## 2024-10-08 DIAGNOSIS — R07.9 CHEST PAIN: ICD-10-CM

## 2024-10-08 PROBLEM — E87.6 HYPOKALEMIA: Status: ACTIVE | Noted: 2024-10-08

## 2024-10-08 PROBLEM — R03.0 ELEVATED BLOOD PRESSURE READING: Status: ACTIVE | Noted: 2024-10-08

## 2024-10-08 LAB
ALBUMIN SERPL BCP-MCNC: 4.3 G/DL (ref 3.5–5.2)
ALP SERPL-CCNC: 59 U/L (ref 55–135)
ALT SERPL W/O P-5'-P-CCNC: 18 U/L (ref 10–44)
AMPHET+METHAMPHET UR QL: NEGATIVE
ANION GAP SERPL CALC-SCNC: 11 MMOL/L (ref 8–16)
AST SERPL-CCNC: 16 U/L (ref 10–40)
BARBITURATES UR QL SCN>200 NG/ML: NEGATIVE
BASOPHILS # BLD AUTO: 0.01 K/UL (ref 0–0.2)
BASOPHILS NFR BLD: 0.1 % (ref 0–1.9)
BENZODIAZ UR QL SCN>200 NG/ML: NEGATIVE
BILIRUB SERPL-MCNC: 0.3 MG/DL (ref 0.1–1)
BILIRUB UR QL STRIP: NEGATIVE
BNP SERPL-MCNC: <10 PG/ML (ref 0–99)
BUN SERPL-MCNC: 7 MG/DL (ref 6–20)
BZE UR QL SCN: NEGATIVE
CALCIUM SERPL-MCNC: 9.6 MG/DL (ref 8.7–10.5)
CANNABINOIDS UR QL SCN: NEGATIVE
CHLORIDE SERPL-SCNC: 103 MMOL/L (ref 95–110)
CLARITY UR: ABNORMAL
CO2 SERPL-SCNC: 24 MMOL/L (ref 23–29)
COLOR UR: YELLOW
CREAT SERPL-MCNC: 0.8 MG/DL (ref 0.5–1.4)
CREAT UR-MCNC: 127.1 MG/DL (ref 15–325)
DIFFERENTIAL METHOD BLD: ABNORMAL
EOSINOPHIL # BLD AUTO: 0 K/UL (ref 0–0.5)
EOSINOPHIL NFR BLD: 0.2 % (ref 0–8)
ERYTHROCYTE [DISTWIDTH] IN BLOOD BY AUTOMATED COUNT: 13.7 % (ref 11.5–14.5)
EST. GFR  (NO RACE VARIABLE): >60 ML/MIN/1.73 M^2
GLUCOSE SERPL-MCNC: 116 MG/DL (ref 70–110)
GLUCOSE UR QL STRIP: NEGATIVE
HCT VFR BLD AUTO: 39.2 % (ref 37–48.5)
HGB BLD-MCNC: 12.8 G/DL (ref 12–16)
HGB UR QL STRIP: NEGATIVE
IMM GRANULOCYTES # BLD AUTO: 0.04 K/UL (ref 0–0.04)
IMM GRANULOCYTES NFR BLD AUTO: 0.4 % (ref 0–0.5)
KETONES UR QL STRIP: NEGATIVE
LEUKOCYTE ESTERASE UR QL STRIP: NEGATIVE
LIPASE SERPL-CCNC: 9 U/L (ref 4–60)
LYMPHOCYTES # BLD AUTO: 1.6 K/UL (ref 1–4.8)
LYMPHOCYTES NFR BLD: 16.4 % (ref 18–48)
MCH RBC QN AUTO: 28.4 PG (ref 27–31)
MCHC RBC AUTO-ENTMCNC: 32.7 G/DL (ref 32–36)
MCV RBC AUTO: 87 FL (ref 82–98)
METHADONE UR QL SCN>300 NG/ML: NEGATIVE
MONOCYTES # BLD AUTO: 0.4 K/UL (ref 0.3–1)
MONOCYTES NFR BLD: 4.3 % (ref 4–15)
NEUTROPHILS # BLD AUTO: 7.6 K/UL (ref 1.8–7.7)
NEUTROPHILS NFR BLD: 78.6 % (ref 38–73)
NITRITE UR QL STRIP: NEGATIVE
NRBC BLD-RTO: 0 /100 WBC
OHS QRS DURATION: 88 MS
OHS QTC CALCULATION: 465 MS
OPIATES UR QL SCN: NEGATIVE
PCP UR QL SCN>25 NG/ML: NEGATIVE
PH UR STRIP: 8 [PH] (ref 5–8)
PLATELET # BLD AUTO: 331 K/UL (ref 150–450)
PMV BLD AUTO: 9.5 FL (ref 9.2–12.9)
POTASSIUM SERPL-SCNC: 3.4 MMOL/L (ref 3.5–5.1)
PROT SERPL-MCNC: 8 G/DL (ref 6–8.4)
PROT UR QL STRIP: NEGATIVE
RBC # BLD AUTO: 4.51 M/UL (ref 4–5.4)
SODIUM SERPL-SCNC: 138 MMOL/L (ref 136–145)
SP GR UR STRIP: 1.02 (ref 1–1.03)
T4 FREE SERPL-MCNC: 1 NG/DL (ref 0.71–1.51)
TOXICOLOGY INFORMATION: NORMAL
TROPONIN I SERPL DL<=0.01 NG/ML-MCNC: 0.01 NG/ML (ref 0–0.03)
TSH SERPL DL<=0.005 MIU/L-ACNC: 0.81 UIU/ML (ref 0.4–4)
URN SPEC COLLECT METH UR: ABNORMAL
UROBILINOGEN UR STRIP-ACNC: NEGATIVE EU/DL
WBC # BLD AUTO: 9.63 K/UL (ref 3.9–12.7)

## 2024-10-08 PROCEDURE — 83690 ASSAY OF LIPASE: CPT | Performed by: PHYSICIAN ASSISTANT

## 2024-10-08 PROCEDURE — 43239 EGD BIOPSY SINGLE/MULTIPLE: CPT | Mod: ,,, | Performed by: INTERNAL MEDICINE

## 2024-10-08 PROCEDURE — 63600175 PHARM REV CODE 636 W HCPCS: Performed by: STUDENT IN AN ORGANIZED HEALTH CARE EDUCATION/TRAINING PROGRAM

## 2024-10-08 PROCEDURE — 99285 EMERGENCY DEPT VISIT HI MDM: CPT | Mod: 25

## 2024-10-08 PROCEDURE — G0378 HOSPITAL OBSERVATION PER HR: HCPCS

## 2024-10-08 PROCEDURE — 93010 ELECTROCARDIOGRAM REPORT: CPT | Mod: ,,, | Performed by: INTERNAL MEDICINE

## 2024-10-08 PROCEDURE — 63600175 PHARM REV CODE 636 W HCPCS: Performed by: PHYSICIAN ASSISTANT

## 2024-10-08 PROCEDURE — 80307 DRUG TEST PRSMV CHEM ANLYZR: CPT | Performed by: PHYSICIAN ASSISTANT

## 2024-10-08 PROCEDURE — 84484 ASSAY OF TROPONIN QUANT: CPT | Performed by: PHYSICIAN ASSISTANT

## 2024-10-08 PROCEDURE — 88305 TISSUE EXAM BY PATHOLOGIST: CPT | Mod: 26,,, | Performed by: PATHOLOGY

## 2024-10-08 PROCEDURE — 96361 HYDRATE IV INFUSION ADD-ON: CPT

## 2024-10-08 PROCEDURE — 99204 OFFICE O/P NEW MOD 45 MIN: CPT | Mod: 25,,, | Performed by: NURSE PRACTITIONER

## 2024-10-08 PROCEDURE — 63600175 PHARM REV CODE 636 W HCPCS: Performed by: NURSE PRACTITIONER

## 2024-10-08 PROCEDURE — 43239 EGD BIOPSY SINGLE/MULTIPLE: CPT | Performed by: INTERNAL MEDICINE

## 2024-10-08 PROCEDURE — 80053 COMPREHEN METABOLIC PANEL: CPT | Performed by: PHYSICIAN ASSISTANT

## 2024-10-08 PROCEDURE — 25500020 PHARM REV CODE 255: Performed by: PHYSICIAN ASSISTANT

## 2024-10-08 PROCEDURE — 96372 THER/PROPH/DIAG INJ SC/IM: CPT | Performed by: NURSE PRACTITIONER

## 2024-10-08 PROCEDURE — 25000003 PHARM REV CODE 250: Performed by: NURSE PRACTITIONER

## 2024-10-08 PROCEDURE — 96365 THER/PROPH/DIAG IV INF INIT: CPT

## 2024-10-08 PROCEDURE — 93005 ELECTROCARDIOGRAM TRACING: CPT

## 2024-10-08 PROCEDURE — 96375 TX/PRO/DX INJ NEW DRUG ADDON: CPT | Mod: 59

## 2024-10-08 PROCEDURE — 84443 ASSAY THYROID STIM HORMONE: CPT | Performed by: PHYSICIAN ASSISTANT

## 2024-10-08 PROCEDURE — 25000003 PHARM REV CODE 250: Performed by: STUDENT IN AN ORGANIZED HEALTH CARE EDUCATION/TRAINING PROGRAM

## 2024-10-08 PROCEDURE — 88305 TISSUE EXAM BY PATHOLOGIST: CPT | Performed by: PATHOLOGY

## 2024-10-08 PROCEDURE — 81003 URINALYSIS AUTO W/O SCOPE: CPT | Mod: 59 | Performed by: PHYSICIAN ASSISTANT

## 2024-10-08 PROCEDURE — 37000008 HC ANESTHESIA 1ST 15 MINUTES: Performed by: INTERNAL MEDICINE

## 2024-10-08 PROCEDURE — 84481 FREE ASSAY (FT-3): CPT | Performed by: PHYSICIAN ASSISTANT

## 2024-10-08 PROCEDURE — 25000003 PHARM REV CODE 250

## 2024-10-08 PROCEDURE — 85025 COMPLETE CBC W/AUTO DIFF WBC: CPT | Performed by: PHYSICIAN ASSISTANT

## 2024-10-08 PROCEDURE — 37000009 HC ANESTHESIA EA ADD 15 MINS: Performed by: INTERNAL MEDICINE

## 2024-10-08 PROCEDURE — 96376 TX/PRO/DX INJ SAME DRUG ADON: CPT

## 2024-10-08 PROCEDURE — 83880 ASSAY OF NATRIURETIC PEPTIDE: CPT | Performed by: PHYSICIAN ASSISTANT

## 2024-10-08 PROCEDURE — 84439 ASSAY OF FREE THYROXINE: CPT | Performed by: PHYSICIAN ASSISTANT

## 2024-10-08 RX ORDER — PROPOFOL 10 MG/ML
VIAL (ML) INTRAVENOUS
Status: DISCONTINUED | OUTPATIENT
Start: 2024-10-08 | End: 2024-10-08

## 2024-10-08 RX ORDER — MORPHINE SULFATE 4 MG/ML
4 INJECTION, SOLUTION INTRAMUSCULAR; INTRAVENOUS
Status: COMPLETED | OUTPATIENT
Start: 2024-10-08 | End: 2024-10-08

## 2024-10-08 RX ORDER — PROCHLORPERAZINE EDISYLATE 5 MG/ML
5 INJECTION INTRAMUSCULAR; INTRAVENOUS EVERY 6 HOURS PRN
Status: DISCONTINUED | OUTPATIENT
Start: 2024-10-08 | End: 2024-10-11 | Stop reason: HOSPADM

## 2024-10-08 RX ORDER — LIDOCAINE HYDROCHLORIDE 20 MG/ML
INJECTION INTRAVENOUS
Status: DISCONTINUED | OUTPATIENT
Start: 2024-10-08 | End: 2024-10-08

## 2024-10-08 RX ORDER — METOCLOPRAMIDE HYDROCHLORIDE 5 MG/ML
5 INJECTION INTRAMUSCULAR; INTRAVENOUS
Status: COMPLETED | OUTPATIENT
Start: 2024-10-08 | End: 2024-10-08

## 2024-10-08 RX ORDER — KETOROLAC TROMETHAMINE 30 MG/ML
15 INJECTION, SOLUTION INTRAMUSCULAR; INTRAVENOUS
Status: COMPLETED | OUTPATIENT
Start: 2024-10-08 | End: 2024-10-08

## 2024-10-08 RX ORDER — GLUCAGON 1 MG
1 KIT INJECTION
Status: DISCONTINUED | OUTPATIENT
Start: 2024-10-08 | End: 2024-10-11 | Stop reason: HOSPADM

## 2024-10-08 RX ORDER — SODIUM CHLORIDE 9 MG/ML
INJECTION, SOLUTION INTRAVENOUS CONTINUOUS
Status: DISCONTINUED | OUTPATIENT
Start: 2024-10-08 | End: 2024-10-09

## 2024-10-08 RX ORDER — LIDOCAINE HYDROCHLORIDE 20 MG/ML
INJECTION, SOLUTION EPIDURAL; INFILTRATION; INTRACAUDAL; PERINEURAL
Status: DISPENSED
Start: 2024-10-08 | End: 2024-10-09

## 2024-10-08 RX ORDER — POTASSIUM CHLORIDE 20 MEQ/1
40 TABLET, EXTENDED RELEASE ORAL ONCE
Status: DISCONTINUED | OUTPATIENT
Start: 2024-10-08 | End: 2024-10-09

## 2024-10-08 RX ORDER — IBUPROFEN 200 MG
16 TABLET ORAL
Status: DISCONTINUED | OUTPATIENT
Start: 2024-10-08 | End: 2024-10-11 | Stop reason: HOSPADM

## 2024-10-08 RX ORDER — ENOXAPARIN SODIUM 100 MG/ML
40 INJECTION SUBCUTANEOUS EVERY 24 HOURS
Status: DISCONTINUED | OUTPATIENT
Start: 2024-10-08 | End: 2024-10-11 | Stop reason: HOSPADM

## 2024-10-08 RX ORDER — PROPOFOL 10 MG/ML
VIAL (ML) INTRAVENOUS
Status: COMPLETED
Start: 2024-10-08 | End: 2024-10-08

## 2024-10-08 RX ORDER — NALOXONE HCL 0.4 MG/ML
0.02 VIAL (ML) INJECTION
Status: DISCONTINUED | OUTPATIENT
Start: 2024-10-08 | End: 2024-10-11 | Stop reason: HOSPADM

## 2024-10-08 RX ORDER — DOCUSATE SODIUM 100 MG/1
100 CAPSULE, LIQUID FILLED ORAL 2 TIMES DAILY
Status: DISCONTINUED | OUTPATIENT
Start: 2024-10-08 | End: 2024-10-11 | Stop reason: HOSPADM

## 2024-10-08 RX ORDER — SUCCINYLCHOLINE CHLORIDE 20 MG/ML
INJECTION INTRAMUSCULAR; INTRAVENOUS
Status: DISCONTINUED | OUTPATIENT
Start: 2024-10-08 | End: 2024-10-08

## 2024-10-08 RX ORDER — HYDROMORPHONE HYDROCHLORIDE 1 MG/ML
0.5 INJECTION, SOLUTION INTRAMUSCULAR; INTRAVENOUS; SUBCUTANEOUS EVERY 4 HOURS PRN
Status: DISCONTINUED | OUTPATIENT
Start: 2024-10-08 | End: 2024-10-11 | Stop reason: HOSPADM

## 2024-10-08 RX ORDER — OXYCODONE HYDROCHLORIDE 5 MG/1
5 TABLET ORAL EVERY 4 HOURS PRN
Status: DISCONTINUED | OUTPATIENT
Start: 2024-10-08 | End: 2024-10-11 | Stop reason: HOSPADM

## 2024-10-08 RX ORDER — ACETAMINOPHEN 325 MG/1
650 TABLET ORAL EVERY 6 HOURS PRN
Status: DISCONTINUED | OUTPATIENT
Start: 2024-10-08 | End: 2024-10-11 | Stop reason: HOSPADM

## 2024-10-08 RX ORDER — ONDANSETRON HYDROCHLORIDE 2 MG/ML
4 INJECTION, SOLUTION INTRAVENOUS
Status: COMPLETED | OUTPATIENT
Start: 2024-10-08 | End: 2024-10-08

## 2024-10-08 RX ORDER — SODIUM CHLORIDE 0.9 % (FLUSH) 0.9 %
10 SYRINGE (ML) INJECTION EVERY 12 HOURS PRN
Status: DISCONTINUED | OUTPATIENT
Start: 2024-10-08 | End: 2024-10-11 | Stop reason: HOSPADM

## 2024-10-08 RX ORDER — IBUPROFEN 200 MG
24 TABLET ORAL
Status: DISCONTINUED | OUTPATIENT
Start: 2024-10-08 | End: 2024-10-11 | Stop reason: HOSPADM

## 2024-10-08 RX ORDER — PANTOPRAZOLE SODIUM 40 MG/10ML
40 INJECTION, POWDER, LYOPHILIZED, FOR SOLUTION INTRAVENOUS 2 TIMES DAILY
Status: DISCONTINUED | OUTPATIENT
Start: 2024-10-08 | End: 2024-10-11 | Stop reason: HOSPADM

## 2024-10-08 RX ORDER — DIPHENHYDRAMINE HYDROCHLORIDE 50 MG/ML
25 INJECTION INTRAMUSCULAR; INTRAVENOUS
Status: COMPLETED | OUTPATIENT
Start: 2024-10-08 | End: 2024-10-08

## 2024-10-08 RX ORDER — SUCCINYLCHOLINE CHLORIDE 20 MG/ML
INJECTION INTRAMUSCULAR; INTRAVENOUS
Status: DISPENSED
Start: 2024-10-08 | End: 2024-10-09

## 2024-10-08 RX ORDER — HYDROMORPHONE HYDROCHLORIDE 1 MG/ML
0.5 INJECTION, SOLUTION INTRAMUSCULAR; INTRAVENOUS; SUBCUTANEOUS
Status: COMPLETED | OUTPATIENT
Start: 2024-10-08 | End: 2024-10-08

## 2024-10-08 RX ADMIN — OXYCODONE HYDROCHLORIDE 5 MG: 5 TABLET ORAL at 06:10

## 2024-10-08 RX ADMIN — PROPOFOL 50 MG: 10 INJECTION, EMULSION INTRAVENOUS at 02:10

## 2024-10-08 RX ADMIN — PROPOFOL 200 MG: 10 INJECTION, EMULSION INTRAVENOUS at 02:10

## 2024-10-08 RX ADMIN — HYDROMORPHONE HYDROCHLORIDE 0.5 MG: 1 INJECTION, SOLUTION INTRAMUSCULAR; INTRAVENOUS; SUBCUTANEOUS at 12:10

## 2024-10-08 RX ADMIN — SODIUM CHLORIDE: 0.9 INJECTION, SOLUTION INTRAVENOUS at 02:10

## 2024-10-08 RX ADMIN — METOCLOPRAMIDE 5 MG: 5 INJECTION, SOLUTION INTRAMUSCULAR; INTRAVENOUS at 09:10

## 2024-10-08 RX ADMIN — LIDOCAINE HYDROCHLORIDE 100 MG: 20 INJECTION, SOLUTION INTRAVENOUS at 02:10

## 2024-10-08 RX ADMIN — ONDANSETRON 4 MG: 2 INJECTION INTRAMUSCULAR; INTRAVENOUS at 12:10

## 2024-10-08 RX ADMIN — KETOROLAC TROMETHAMINE 15 MG: 30 INJECTION, SOLUTION INTRAMUSCULAR at 08:10

## 2024-10-08 RX ADMIN — IOHEXOL 100 ML: 350 INJECTION, SOLUTION INTRAVENOUS at 10:10

## 2024-10-08 RX ADMIN — ONDANSETRON 4 MG: 2 INJECTION INTRAMUSCULAR; INTRAVENOUS at 08:10

## 2024-10-08 RX ADMIN — PROPOFOL 30 MG: 10 INJECTION, EMULSION INTRAVENOUS at 02:10

## 2024-10-08 RX ADMIN — SUCCINYLCHOLINE CHLORIDE 140 MG: 20 INJECTION, SOLUTION INTRAMUSCULAR; INTRAVENOUS at 02:10

## 2024-10-08 RX ADMIN — ENOXAPARIN SODIUM 40 MG: 40 INJECTION SUBCUTANEOUS at 06:10

## 2024-10-08 RX ADMIN — DOCUSATE SODIUM 100 MG: 100 CAPSULE, LIQUID FILLED ORAL at 07:10

## 2024-10-08 RX ADMIN — MORPHINE SULFATE 4 MG: 4 INJECTION, SOLUTION INTRAMUSCULAR; INTRAVENOUS at 08:10

## 2024-10-08 RX ADMIN — DIPHENHYDRAMINE HYDROCHLORIDE 25 MG: 50 INJECTION INTRAMUSCULAR; INTRAVENOUS at 09:10

## 2024-10-08 RX ADMIN — SODIUM CHLORIDE: 9 INJECTION, SOLUTION INTRAVENOUS at 06:10

## 2024-10-08 RX ADMIN — PANTOPRAZOLE SODIUM 40 MG: 40 INJECTION, POWDER, FOR SOLUTION INTRAVENOUS at 07:10
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
## 2024-10-08 NOTE — PHARMACY MED REC
"Admission Medication History     The home medication history was taken by Eliana Peoples CPhT.    You may go to "Admission" then "Reconcile Home Medications" tabs to review and/or act upon these items.     The home medication list has been updated by the Pharmacy department.   Please read ALL comments highlighted in yellow.   Please address this information as you see fit.    Feel free to contact us if you have any questions or require assistance.      The medications listed below were removed from the home medication list. Please reorder if appropriate:  Patient reports no longer taking the following medication(s):  Benzocaine-menthol lozeng  Romycin opth  Pepcid 20 mg tab  Flonase  Atarax 25 mg tab  Advil,Motrin 800 mg tab  Zofran-ODT 4 mg tab  Carafate   Nuvaring      Medications listed below were obtained from: Patient/family and [employer]- [employer]  (Not in a hospital admission)      Eliana Peoples CPhT.  [phone]                  .          "
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
## 2024-10-08 NOTE — ANESTHESIA PREPROCEDURE EVALUATION
10/08/2024  Effie Tillman is a 39 y.o., female.  To undergo Procedure(s) (LRB):  EGD (ESOPHAGOGASTRODUODENOSCOPY) (N/A)     Denies CP/SOB/GERD/MI/CVA/URI symptoms.  Endorses N/V.  METS > 4  NPO > 8    Past Medical History:  Past Medical History:   Diagnosis Date    Electrical injuries Jan 19 2013    R arm.  Right ring finger    Ovarian cyst        Past Surgical History:  Past Surgical History:   Procedure Laterality Date    HYSTERECTOMY      TUBAL LIGATION      TUBAL LIGATION         Social History:  Social History     Socioeconomic History    Marital status:    Tobacco Use    Smoking status: Never    Smokeless tobacco: Never   Substance and Sexual Activity    Alcohol use: Yes     Comment: occassionally    Drug use: No    Sexual activity: Yes     Birth control/protection: Other-see comments     Comment: BTL       Medications:  No current facility-administered medications on file prior to encounter.     Current Outpatient Medications on File Prior to Encounter   Medication Sig Dispense Refill    benzocaine-menthoL 6-10 mg lozenge Take 1 lozenge by mouth every 2 (two) hours as needed for Pain (sore throat). (Patient not taking: Reported on 10/8/2024) 18 tablet 0    erythromycin (ROMYCIN) ophthalmic ointment Place a 1/2 inch ribbon of ointment into the lower eyelid 6 x daily. (Patient not taking: Reported on 10/8/2024) 1 Tube 0    etonogestrel-ethinyl estradiol (NUVARING) 0.12-0.015 mg/24 hr vaginal ring Place 1 each vaginally every 28 days. (Patient not taking: Reported on 10/8/2024) 1 each 11    famotidine (PEPCID) 20 MG tablet Take 1 tablet (20 mg total) by mouth 2 (two) times daily. (Patient not taking: Reported on 10/8/2024) 20 tablet 0    fluticasone propionate (FLONASE) 50 mcg/actuation nasal spray 1 spray (50 mcg total) by Each Nostril route 2 (two) times daily. (Patient not taking: Reported on 10/8/2024) 1 Bottle 0    hydrOXYzine HCL (ATARAX) 25 MG tablet Take 2 tablets (50 mg total) by mouth  every 6 (six) hours. (Patient not taking: Reported on 10/8/2024) 30 tablet 0    ibuprofen (ADVIL,MOTRIN) 800 MG tablet Take 1 tablet (800 mg total) by mouth every 8 (eight) hours as needed for Pain. (Patient not taking: Reported on 10/8/2024) 30 tablet 0    ondansetron (ZOFRAN-ODT) 4 MG TbDL Take 1 tablet (4 mg total) by mouth every 6 (six) hours as needed (Nausea). (Patient not taking: Reported on 10/8/2024) 15 tablet 0    sucralfate (CARAFATE) 100 mg/mL suspension Take 10 mLs (1 g total) by mouth 4 (four) times daily as needed (heart burn). (Patient not taking: Reported on 10/8/2024) 414 mL 0       Allergies:  Review of patient's allergies indicates:  No Known Allergies    Active Problems:  Patient Active Problem List   Diagnosis    Uterine leiomyoma    Left ankle pain    Intractable nausea and vomiting    Epigastric pain    Hypokalemia    Sinus bradycardia    Elevated blood pressure reading without diagnosis of hypertension       Diagnostic Studies:   Latest Reference Range & Units 10/08/24 08:25   WBC 3.90 - 12.70 K/uL 9.63   RBC 4.00 - 5.40 M/uL 4.51   Hemoglobin 12.0 - 16.0 g/dL 12.8   Hematocrit 37.0 - 48.5 % 39.2   MCV 82 - 98 fL 87   MCH 27.0 - 31.0 pg 28.4   MCHC 32.0 - 36.0 g/dL 32.7   RDW 11.5 - 14.5 % 13.7   Platelet Count 150 - 450 K/uL 331   MPV 9.2 - 12.9 fL 9.5   Gran % 38.0 - 73.0 % 78.6 (H)   Lymph % 18.0 - 48.0 % 16.4 (L)   Mono % 4.0 - 15.0 % 4.3   Eos % 0.0 - 8.0 % 0.2   Basophil % 0.0 - 1.9 % 0.1   Immature Granulocytes 0.0 - 0.5 % 0.4   Gran # (ANC) 1.8 - 7.7 K/uL 7.6   Lymph # 1.0 - 4.8 K/uL 1.6   Mono # 0.3 - 1.0 K/uL 0.4   Eos # 0.0 - 0.5 K/uL 0.0   Baso # 0.00 - 0.20 K/uL 0.01   Immature Grans (Abs) 0.00 - 0.04 K/uL 0.04   nRBC 0 /100 WBC 0   Differential Method  Automated      Latest Reference Range & Units 10/08/24 09:34   Sodium 136 - 145 mmol/L 138   Potassium 3.5 - 5.1 mmol/L 3.4 (L)   Chloride 95 - 110 mmol/L 103   CO2 23 - 29 mmol/L 24   Anion Gap 8 - 16 mmol/L 11   BUN 6 - 20  mg/dL 7   Creatinine 0.5 - 1.4 mg/dL 0.8   eGFR >60 mL/min/1.73 m^2 >60   Glucose 70 - 110 mg/dL 116 (H)   Calcium 8.7 - 10.5 mg/dL 9.6   ALP 55 - 135 U/L 59   PROTEIN TOTAL 6.0 - 8.4 g/dL 8.0   Albumin 3.5 - 5.2 g/dL 4.3   BILIRUBIN TOTAL 0.1 - 1.0 mg/dL 0.3   AST 10 - 40 U/L 16   ALT 10 - 44 U/L 18   Lipase 4 - 60 U/L 9     EKG (10/8/24):  SB, prolonged QT    24 Hour Vitals:  Temp:  [36.4 °C (97.5 °F)-37.2 °C (98.9 °F)] 37.2 °C (98.9 °F)  Pulse:  [60-74] 74  Resp:  [18-20] 18  SpO2:  [98 %-100 %] 100 %  BP: (180)/(80-85) 180/85   See Nursing Charting For Additional Vitals      Pre-op Assessment    I have reviewed the Patient Summary Reports.     I have reviewed the Nursing Notes.       Review of Systems  Anesthesia Hx:  No problems with previous Anesthesia               Denies Personal Hx of Anesthesia complications.                    Social:  Non-Smoker, Social Alcohol Use       Cardiovascular:  Cardiovascular Normal Exercise tolerance: good                 ECG has been reviewed.                          Pulmonary:  Pulmonary Normal                       Hepatic/GI:        Intractable N/V          Neurological:  Neurology Normal                                      Endocrine:        Obesity / BMI > 30      Physical Exam  General: Well nourished and Cooperative    Airway:  Mallampati: III   Mouth Opening: Normal  TM Distance: Normal    Dental:  Partial Dentures    Chest/Lungs:  Clear to auscultation, Normal Respiratory Rate    Heart:  Rate: Normal  Rhythm: Regular Rhythm        Anesthesia Plan  Type of Anesthesia, risks & benefits discussed:    Anesthesia Type: Gen ETT  Intra-op Monitoring Plan: Standard ASA Monitors  Post Op Pain Control Plan: multimodal analgesia and IV/PO Opioids PRN  Induction:  IV and rapid sequence  Airway Plan: Direct and Video, Post-Induction  Informed Consent: Informed consent signed with the Patient and all parties understand the risks and agree with anesthesia plan.  All questions  answered.   ASA Score: 2  Anesthesia Plan Notes:   GA with OETT, RSI  Standard ASA monitors  Recovery in PACU  PONV: 2    Ready For Surgery From Anesthesia Perspective.     .

## 2024-10-08 NOTE — H&P
"  [facility] Medicine  History & Physical    Patient Name: Effie Tillman  MRN: [id number]  Patient Class: OP- Observation  Admission Date: 10/8/2024  Attending Physician: Hunter Tinoco MD   Primary Care Provider: Jennifer, Primary Doctor         Patient information was obtained from patient and ER records.     Subjective:     Principal Problem:Epigastric pain    Chief Complaint:   Chief Complaint   Patient presents with    Abdominal Pain     Pt c/o pain above her umbilical area that began last night. Pt stated she can feel a knot in her abdomen, and has nausea and vomiting.        HPI: 39 year old female with past medical history of ovarian cyst present to the ED with complain of epigastric and right upper quadrant abdominal pain with associated nausea and vomiting that began last night. Patient reports it feels like she has "a knot in her abdomen". Reports normal bowel movements but notes she has not passed flatus since yesterday. Denies history of gastric ulcers or gastritis. Patient states she has had "too many" emesis episodes to count. Patient further endorses chest pain with associated dyspnea while in ED. Denies any past cardiac history. Reports history of heart burn but states current episode does not feel similar. No other aggravating/alleviating factors. Denies dysuria, urinary frequency, diarrhea, or other associated symptoms. Patient notes occasionally EtOH use. States last EtOH usage was 3 days ago. Patient denies illicit drug use.    In the ED, Labs K 3.4, otherwise unremarkable  CT abdomen and pelvis: Subcentimeter hypodensity within the lateral aspect of the posterior right lobe of the liver, too small to adequately characterize. Cholelithiasis.Subcentimeter hypodensity originating from the lower pole of the left kidney, likely a small cyst.Status post hysterectomy. 2.9 cm right adnexal cyst, likely a dominant follicle.Bilateral fat containing inguinal hernias.  CXR: showed " Interstitial findings are accentuated by habitus and shallow inspiratory effort, no large focal consolidation.   EKG: with sinus bradycardia with ventricular rate of 57. SD interval 186. QRS 88.  milliseconds.       Past Medical History:   Diagnosis Date    Electrical injuries Jan 19 2013    R arm.  Right ring finger    Ovarian cyst        Past Surgical History:   Procedure Laterality Date    HYSTERECTOMY      TUBAL LIGATION      TUBAL LIGATION         Review of patient's allergies indicates:  No Known Allergies    No current facility-administered medications on file prior to encounter.     Current Outpatient Medications on File Prior to Encounter   Medication Sig    benzocaine-menthoL 6-10 mg lozenge Take 1 lozenge by mouth every 2 (two) hours as needed for Pain (sore throat). (Patient not taking: Reported on 10/8/2024)    erythromycin (ROMYCIN) ophthalmic ointment Place a 1/2 inch ribbon of ointment into the lower eyelid 6 x daily. (Patient not taking: Reported on 10/8/2024)    etonogestrel-ethinyl estradiol (NUVARING) 0.12-0.015 mg/24 hr vaginal ring Place 1 each vaginally every 28 days. (Patient not taking: Reported on 10/8/2024)    famotidine (PEPCID) 20 MG tablet Take 1 tablet (20 mg total) by mouth 2 (two) times daily. (Patient not taking: Reported on 10/8/2024)    fluticasone propionate (FLONASE) 50 mcg/actuation nasal spray 1 spray (50 mcg total) by Each Nostril route 2 (two) times daily. (Patient not taking: Reported on 10/8/2024)    hydrOXYzine HCL (ATARAX) 25 MG tablet Take 2 tablets (50 mg total) by mouth every 6 (six) hours. (Patient not taking: Reported on 10/8/2024)    ibuprofen (ADVIL,MOTRIN) 800 MG tablet Take 1 tablet (800 mg total) by mouth every 8 (eight) hours as needed for Pain. (Patient not taking: Reported on 10/8/2024)    ondansetron (ZOFRAN-ODT) 4 MG TbDL Take 1 tablet (4 mg total) by mouth every 6 (six) hours as needed (Nausea). (Patient not taking: Reported on 10/8/2024)     sucralfate (CARAFATE) 100 mg/mL suspension Take 10 mLs (1 g total) by mouth 4 (four) times daily as needed (heart burn). (Patient not taking: Reported on 10/8/2024)     Family History    None       Tobacco Use    Smoking status: Never    Smokeless tobacco: Never   Substance and Sexual Activity    Alcohol use: Yes     Comment: occassionally    Drug use: No    Sexual activity: Yes     Birth control/protection: Other-see comments     Comment: BTL     Review of Systems   Respiratory:  Positive for shortness of breath.    Cardiovascular:  Positive for chest pain.   Gastrointestinal:  Positive for abdominal pain, nausea and vomiting.     Objective:     Vital Signs (Most Recent):  Temp: 98.9 °F (37.2 °C) (10/08/24 1034)  Pulse: 74 (10/08/24 1034)  Resp: 18 (10/08/24 1245)  BP: (!) 180/85 (10/08/24 1034)  SpO2: 100 % (10/08/24 1034) Vital Signs (24h Range):  Temp:  [97.5 °F (36.4 °C)-98.9 °F (37.2 °C)] 98.9 °F (37.2 °C)  Pulse:  [60-74] 74  Resp:  [18-20] 18  SpO2:  [98 %-100 %] 100 %  BP: (180)/(80-85) 180/85     Weight: 96.2 kg (212 lb)  Body mass index is 35.28 kg/m².     Physical Exam  Constitutional:       General: She is in acute distress.      Appearance: She is obese. She is not ill-appearing.   HENT:      Head: Normocephalic and atraumatic.      Mouth/Throat:      Mouth: Mucous membranes are dry.   Eyes:      Extraocular Movements: Extraocular movements intact.   Cardiovascular:      Rate and Rhythm: Bradycardia present.   Abdominal:      Tenderness: There is abdominal tenderness. There is guarding.      Comments: Epigastric tenderness    Musculoskeletal:         General: Normal range of motion.      Cervical back: Normal range of motion.   Skin:     General: Skin is warm and dry.   Neurological:      Mental Status: She is alert and oriented to person, place, and time. Mental status is at baseline.   Psychiatric:         Mood and Affect: Mood normal.         Behavior: Behavior normal.         Thought Content:  Thought content normal.         Judgment: Judgment normal.                Significant Labs: All pertinent labs within the past 24 hours have been reviewed.    Significant Imaging: I have reviewed all pertinent imaging results/findings within the past 24 hours.  Assessment/Plan:     * Epigastric pain  GI rec: Plan for EGD on today. Rec stool softeners and collect stool culture d/t suspicion of food poisoning.   NPO  PPI   Pain control   Bowel regimen       Elevated blood pressure reading without diagnosis of hypertension  PTA not on medication  Likely secondary to abdominal pain and vomiting  Monitor for now       Sinus bradycardia  Tele monitoring  EKG as needed  Added TSH, T4, and T3         Hypokalemia  Patient's most recent potassium results are listed below.   Recent Labs     10/08/24  0934   K 3.4*     Plan  - Replete potassium per protocol  - Monitor potassium Daily  - Patient's hypokalemia is stable      Intractable nausea and vomiting  Antiemetics as needed         VTE Risk Mitigation (From admission, onward)           Ordered     enoxaparin injection 40 mg  Every 24 hours         10/08/24 1339     IP VTE HIGH RISK PATIENT  Once         10/08/24 1259     Place sequential compression device  Until discontinued         10/08/24 1259                         On 10/08/2024, patient should be placed in hospital observation services under my care in collaboration with Dr. Tinoco.           Eleni Mirza NP  Department of Hospital Medicine  [facility] - Emergency Dept

## 2024-10-08 NOTE — TRANSFER OF CARE
"Anesthesia Transfer of Care Note    Patient: Effie Tillman    Procedure(s) Performed: Procedure(s) (LRB):  EGD (ESOPHAGOGASTRODUODENOSCOPY) (N/A)    Patient location: GI    Anesthesia Type: general    Transport from OR: Transported from OR on room air with adequate spontaneous ventilation    Post pain: adequate analgesia    Post assessment: no apparent anesthetic complications and tolerated procedure well    Post vital signs: stable    Level of consciousness: awake and alert    Nausea/Vomiting: no nausea/vomiting    Complications: none    Transfer of care protocol was followed      Last vitals: Visit Vitals  BP (!) 158/73   Pulse 100   Temp 36.4 °C (97.6 °F)   Resp 18   Ht 5' 5" (1.651 m)   Wt 96.2 kg (212 lb)   LMP 07/10/2019   SpO2 (!) 94%   Breastfeeding No   BMI 35.28 kg/m²     "

## 2024-10-08 NOTE — SUBJECTIVE & OBJECTIVE
Past Medical History:   Diagnosis Date    Electrical injuries Jan 19 2013    R arm.  Right ring finger    Ovarian cyst        Past Surgical History:   Procedure Laterality Date    HYSTERECTOMY      TUBAL LIGATION      TUBAL LIGATION         Review of patient's allergies indicates:  No Known Allergies    No current facility-administered medications on file prior to encounter.     Current Outpatient Medications on File Prior to Encounter   Medication Sig    benzocaine-menthoL 6-10 mg lozenge Take 1 lozenge by mouth every 2 (two) hours as needed for Pain (sore throat). (Patient not taking: Reported on 10/8/2024)    erythromycin (ROMYCIN) ophthalmic ointment Place a 1/2 inch ribbon of ointment into the lower eyelid 6 x daily. (Patient not taking: Reported on 10/8/2024)    etonogestrel-ethinyl estradiol (NUVARING) 0.12-0.015 mg/24 hr vaginal ring Place 1 each vaginally every 28 days. (Patient not taking: Reported on 10/8/2024)    famotidine (PEPCID) 20 MG tablet Take 1 tablet (20 mg total) by mouth 2 (two) times daily. (Patient not taking: Reported on 10/8/2024)    fluticasone propionate (FLONASE) 50 mcg/actuation nasal spray 1 spray (50 mcg total) by Each Nostril route 2 (two) times daily. (Patient not taking: Reported on 10/8/2024)    hydrOXYzine HCL (ATARAX) 25 MG tablet Take 2 tablets (50 mg total) by mouth every 6 (six) hours. (Patient not taking: Reported on 10/8/2024)    ibuprofen (ADVIL,MOTRIN) 800 MG tablet Take 1 tablet (800 mg total) by mouth every 8 (eight) hours as needed for Pain. (Patient not taking: Reported on 10/8/2024)    ondansetron (ZOFRAN-ODT) 4 MG TbDL Take 1 tablet (4 mg total) by mouth every 6 (six) hours as needed (Nausea). (Patient not taking: Reported on 10/8/2024)    sucralfate (CARAFATE) 100 mg/mL suspension Take 10 mLs (1 g total) by mouth 4 (four) times daily as needed (heart burn). (Patient not taking: Reported on 10/8/2024)     Family History    None       Tobacco Use    Smoking status:  Never    Smokeless tobacco: Never   Substance and Sexual Activity    Alcohol use: Yes     Comment: occassionally    Drug use: No    Sexual activity: Yes     Birth control/protection: Other-see comments     Comment: BTL     Review of Systems   Respiratory:  Positive for shortness of breath.    Cardiovascular:  Positive for chest pain.   Gastrointestinal:  Positive for abdominal pain, nausea and vomiting.     Objective:     Vital Signs (Most Recent):  Temp: 98.9 °F (37.2 °C) (10/08/24 1034)  Pulse: 74 (10/08/24 1034)  Resp: 18 (10/08/24 1245)  BP: (!) 180/85 (10/08/24 1034)  SpO2: 100 % (10/08/24 1034) Vital Signs (24h Range):  Temp:  [97.5 °F (36.4 °C)-98.9 °F (37.2 °C)] 98.9 °F (37.2 °C)  Pulse:  [60-74] 74  Resp:  [18-20] 18  SpO2:  [98 %-100 %] 100 %  BP: (180)/(80-85) 180/85     Weight: 96.2 kg (212 lb)  Body mass index is 35.28 kg/m².     Physical Exam  Constitutional:       General: She is in acute distress.      Appearance: She is obese. She is not ill-appearing.   HENT:      Head: Normocephalic and atraumatic.      Mouth/Throat:      Mouth: Mucous membranes are dry.   Eyes:      Extraocular Movements: Extraocular movements intact.   Cardiovascular:      Rate and Rhythm: Bradycardia present.   Abdominal:      Tenderness: There is abdominal tenderness. There is guarding.      Comments: Epigastric tenderness    Musculoskeletal:         General: Normal range of motion.      Cervical back: Normal range of motion.   Skin:     General: Skin is warm and dry.   Neurological:      Mental Status: She is alert and oriented to person, place, and time. Mental status is at baseline.   Psychiatric:         Mood and Affect: Mood normal.         Behavior: Behavior normal.         Thought Content: Thought content normal.         Judgment: Judgment normal.                Significant Labs: All pertinent labs within the past 24 hours have been reviewed.    Significant Imaging: I have reviewed all pertinent imaging results/findings  within the past 24 hours.

## 2024-10-08 NOTE — PLAN OF CARE
Case Management Assessment     PCP: Dr. Richardson  Ph[facility]: [facility]    Patient Arrived From: home   Existing Help at Home: spouse    Barriers to Discharge: none    Discharge Plan:    A. Home with family   B. Home with family    SW completed Brief assessment and discussed discharge planning with patient at her bedside. Patient stated that she lives with her spouse and children they are her support system. Patient's spouse will provide transportation for her to get home when discharge from the hospital.      10/08/24 1527   Discharge Planning   Assessment Type Discharge Planning Brief Assessment   Resource/Environmental Concerns none   Support Systems Spouse/significant other   Equipment Currently Used at Home none   Current Living Arrangements home   Patient/Family Anticipates Transition to home with family   Patient/Family Anticipated Services at Transition none   DME Needed Upon Discharge  none   Discharge Plan A Home with family   Discharge Plan B Home with family

## 2024-10-08 NOTE — ASSESSMENT & PLAN NOTE
GI rec: Plan for EGD on today. Rec stool softeners and collect stool culture d/t suspicion of food poisoning.   NPO  PPI   Pain control   Bowel regimen

## 2024-10-08 NOTE — PROVATION PATIENT INSTRUCTIONS
Discharge Summary/Instructions after an Endoscopic Procedure  Patient Name: Effie Tillman  Patient MRN: [id number]  Patient YOB: 1985 Tuesday, October 8, 2024  Jaida Hidalgo MD  Dear patient,  As a result of recent federal legislation (The Federal Cures Act), you may   receive lab or pathology results from your procedure in your [facility]   account before your physician is able to contact you. Your physician or   their representative will relay the results to you with their   recommendations at their soonest availability.  Thank you,  RESTRICTIONS:  During your procedure today, you received medications for sedation.  These   medications may affect your judgment, balance and coordination.  Therefore,   for 24 hours, you have the following restrictions:   - DO NOT drive a car, operate machinery, make legal/financial decisions,   sign important papers or drink alcohol.    ACTIVITY:  Today: no heavy lifting, straining or running due to procedural   sedation/anesthesia.  The following day: return to full activity including work.  DIET:  Eat and drink normally unless instructed otherwise.     TREATMENT FOR COMMON SIDE EFFECTS:  - Mild abdominal pain, nausea, belching, bloating or excessive gas:  rest,   eat lightly and use a heating pad.  - Sore Throat: treat with throat lozenges and/or gargle with warm salt   water.  - Because air was used during the procedure, expelling large amounts of air   from your rectum or belching is normal.  - If a bowel prep was taken, you may not have a bowel movement for 1-3 days.    This is normal.  SYMPTOMS TO WATCH FOR AND REPORT TO YOUR PHYSICIAN:  1. Abdominal pain or bloating, other than gas cramps.  2. Chest pain.  3. Back pain.  4. Signs of infection such as: chills or fever occurring within 24 hours   after the procedure.  5. Rectal bleeding, which would show as bright red, maroon, or black stools.   (A tablespoon of blood from the rectum is not serious, especially  if   hemorrhoids are present.)  6. Vomiting.  7. Weakness or dizziness.  GO DIRECTLY TO THE NEAREST EMERGENCY ROOM IF YOU HAVE ANY OF THE FOLLOWING:      Difficulty breathing              Chills and/or fever over 101 F   Persistent vomiting and/or vomiting blood   Severe abdominal pain   Severe chest pain   Black, tarry stools   Bleeding- more than one tablespoon   Any other symptom or condition that you feel may need urgent attention  Your doctor recommends these additional instructions:  If any biopsies were taken, your doctors clinic will contact you in 1 to 2   weeks with any results.  - Patient has a contact number available for emergencies.  The signs and   symptoms of potential delayed complications were discussed with the   patient.  Return to normal activities tomorrow.  Written discharge   instructions were provided to the patient.   - Return patient to hospital [facility] for ongoing care.   - Resume previous diet.   - Continue present medications.   - Await pathology results.   - Consider a general surgery consult for possible biliary colic.  For questions, problems or results please call your physician - Jaida Hidalgo MD at Work:  [phone].  [facility] can be reached at [phone]     IF A COMPLICATION OR EMERGENCY SITUATION ARISES AND YOU ARE UNABLE TO REACH   YOUR PHYSICIAN - GO DIRECTLY TO THE EMERGENCY ROOM.  Jaida Hidalgo MD  10/8/2024 3:05:40 PM  This report has been verified and signed electronically.  Dear patient,  As a result of recent federal legislation (The Federal Cures Act), you may   receive lab or pathology results from your procedure in your [facility]   account before your physician is able to contact you. Your physician or   their representative will relay the results to you with their   recommendations at their soonest availability.  Thank you,  PROVATION

## 2024-10-08 NOTE — HPI
"39 year old female with past medical history of ovarian cyst present to the ED with complain of epigastric and right upper quadrant abdominal pain with associated nausea and vomiting that began last night. Patient reports it feels like she has "a knot in her abdomen". Reports normal bowel movements but notes she has not passed flatus since yesterday. Denies history of gastric ulcers or gastritis. Patient states she has had "too many" emesis episodes to count. Patient further endorses chest pain with associated dyspnea while in ED. Denies any past cardiac history. Reports history of heart burn but states current episode does not feel similar. No other aggravating/alleviating factors. Denies dysuria, urinary frequency, diarrhea, or other associated symptoms. Patient notes occasionally EtOH use. States last EtOH usage was 3 days ago. Patient denies illicit drug use.    In the ED, Labs K 3.4, otherwise unremarkable  CT abdomen and pelvis: Subcentimeter hypodensity within the lateral aspect of the posterior right lobe of the liver, too small to adequately characterize. Cholelithiasis.Subcentimeter hypodensity originating from the lower pole of the left kidney, likely a small cyst.Status post hysterectomy. 2.9 cm right adnexal cyst, likely a dominant follicle.Bilateral fat containing inguinal hernias.  CXR: showed Interstitial findings are accentuated by habitus and shallow inspiratory effort, no large focal consolidation.   EKG: with sinus bradycardia with ventricular rate of 57. UT interval 186. QRS 88.  milliseconds.     "

## 2024-10-08 NOTE — ASSESSMENT & PLAN NOTE
Patient's most recent potassium results are listed below.   Recent Labs     10/08/24  0934   K 3.4*     Plan  - Replete potassium per protocol  - Monitor potassium Daily  - Patient's hypokalemia is stable

## 2024-10-09 PROBLEM — E83.39 HYPOPHOSPHATEMIA: Status: ACTIVE | Noted: 2024-10-09

## 2024-10-09 LAB
ANION GAP SERPL CALC-SCNC: 8 MMOL/L (ref 8–16)
BUN SERPL-MCNC: 6 MG/DL (ref 6–20)
CALCIUM SERPL-MCNC: 8.6 MG/DL (ref 8.7–10.5)
CHLORIDE SERPL-SCNC: 106 MMOL/L (ref 95–110)
CO2 SERPL-SCNC: 24 MMOL/L (ref 23–29)
CREAT SERPL-MCNC: 0.9 MG/DL (ref 0.5–1.4)
ERYTHROCYTE [DISTWIDTH] IN BLOOD BY AUTOMATED COUNT: 13.7 % (ref 11.5–14.5)
EST. GFR  (NO RACE VARIABLE): >60 ML/MIN/1.73 M^2
GLUCOSE SERPL-MCNC: 111 MG/DL (ref 70–110)
HCT VFR BLD AUTO: 37.5 % (ref 37–48.5)
HGB BLD-MCNC: 12.1 G/DL (ref 12–16)
MAGNESIUM SERPL-MCNC: 1.8 MG/DL (ref 1.6–2.6)
MCH RBC QN AUTO: 28 PG (ref 27–31)
MCHC RBC AUTO-ENTMCNC: 32.3 G/DL (ref 32–36)
MCV RBC AUTO: 87 FL (ref 82–98)
PHOSPHATE SERPL-MCNC: 2.3 MG/DL (ref 2.7–4.5)
PHOSPHATE SERPL-MCNC: 3.4 MG/DL (ref 2.7–4.5)
PLATELET # BLD AUTO: 309 K/UL (ref 150–450)
PMV BLD AUTO: 9.3 FL (ref 9.2–12.9)
POTASSIUM SERPL-SCNC: 3.2 MMOL/L (ref 3.5–5.1)
POTASSIUM SERPL-SCNC: 3.6 MMOL/L (ref 3.5–5.1)
RBC # BLD AUTO: 4.32 M/UL (ref 4–5.4)
SODIUM SERPL-SCNC: 138 MMOL/L (ref 136–145)
T3FREE SERPL-MCNC: 3 PG/ML (ref 2.3–4.2)
WBC # BLD AUTO: 10.12 K/UL (ref 3.9–12.7)

## 2024-10-09 PROCEDURE — 99205 OFFICE O/P NEW HI 60 MIN: CPT | Mod: ,,, | Performed by: SURGERY

## 2024-10-09 PROCEDURE — 63600175 PHARM REV CODE 636 W HCPCS: Performed by: NURSE PRACTITIONER

## 2024-10-09 PROCEDURE — 80048 BASIC METABOLIC PNL TOTAL CA: CPT | Performed by: NURSE PRACTITIONER

## 2024-10-09 PROCEDURE — G0378 HOSPITAL OBSERVATION PER HR: HCPCS

## 2024-10-09 PROCEDURE — 87046 STOOL CULTR AEROBIC BACT EA: CPT | Mod: 59 | Performed by: NURSE PRACTITIONER

## 2024-10-09 PROCEDURE — 87186 SC STD MICRODIL/AGAR DIL: CPT | Performed by: NURSE PRACTITIONER

## 2024-10-09 PROCEDURE — 96366 THER/PROPH/DIAG IV INF ADDON: CPT

## 2024-10-09 PROCEDURE — 96376 TX/PRO/DX INJ SAME DRUG ADON: CPT

## 2024-10-09 PROCEDURE — 25000003 PHARM REV CODE 250: Performed by: NURSE PRACTITIONER

## 2024-10-09 PROCEDURE — 87449 NOS EACH ORGANISM AG IA: CPT | Performed by: NURSE PRACTITIONER

## 2024-10-09 PROCEDURE — 87045 FECES CULTURE AEROBIC BACT: CPT | Performed by: NURSE PRACTITIONER

## 2024-10-09 PROCEDURE — 25000003 PHARM REV CODE 250

## 2024-10-09 PROCEDURE — 96372 THER/PROPH/DIAG INJ SC/IM: CPT | Performed by: NURSE PRACTITIONER

## 2024-10-09 PROCEDURE — 83735 ASSAY OF MAGNESIUM: CPT | Performed by: NURSE PRACTITIONER

## 2024-10-09 PROCEDURE — 96375 TX/PRO/DX INJ NEW DRUG ADDON: CPT

## 2024-10-09 PROCEDURE — 84100 ASSAY OF PHOSPHORUS: CPT | Performed by: NURSE PRACTITIONER

## 2024-10-09 PROCEDURE — 84132 ASSAY OF SERUM POTASSIUM: CPT | Performed by: NURSE PRACTITIONER

## 2024-10-09 PROCEDURE — 85027 COMPLETE CBC AUTOMATED: CPT | Performed by: NURSE PRACTITIONER

## 2024-10-09 PROCEDURE — 96361 HYDRATE IV INFUSION ADD-ON: CPT

## 2024-10-09 PROCEDURE — 87427 SHIGA-LIKE TOXIN AG IA: CPT | Mod: 59 | Performed by: NURSE PRACTITIONER

## 2024-10-09 RX ORDER — POTASSIUM CHLORIDE 20 MEQ/1
40 TABLET, EXTENDED RELEASE ORAL ONCE
Status: DISCONTINUED | OUTPATIENT
Start: 2024-10-09 | End: 2024-10-09

## 2024-10-09 RX ADMIN — ENOXAPARIN SODIUM 40 MG: 40 INJECTION SUBCUTANEOUS at 05:10

## 2024-10-09 RX ADMIN — DOCUSATE SODIUM 100 MG: 100 CAPSULE, LIQUID FILLED ORAL at 08:10

## 2024-10-09 RX ADMIN — OXYCODONE HYDROCHLORIDE 5 MG: 5 TABLET ORAL at 01:10

## 2024-10-09 RX ADMIN — OXYCODONE HYDROCHLORIDE 5 MG: 5 TABLET ORAL at 11:10

## 2024-10-09 RX ADMIN — PANTOPRAZOLE SODIUM 40 MG: 40 INJECTION, POWDER, FOR SOLUTION INTRAVENOUS at 08:10

## 2024-10-09 RX ADMIN — OXYCODONE HYDROCHLORIDE 5 MG: 5 TABLET ORAL at 09:10

## 2024-10-09 RX ADMIN — ACETAMINOPHEN 650 MG: 325 TABLET ORAL at 11:10

## 2024-10-09 RX ADMIN — PANTOPRAZOLE SODIUM 40 MG: 40 INJECTION, POWDER, FOR SOLUTION INTRAVENOUS at 09:10

## 2024-10-09 RX ADMIN — POTASSIUM PHOSPHATE, MONOBASIC AND POTASSIUM PHOSPHATE, DIBASIC 20 MMOL: 224; 236 INJECTION, SOLUTION, CONCENTRATE INTRAVENOUS at 11:10

## 2024-10-09 RX ADMIN — OXYCODONE HYDROCHLORIDE 5 MG: 5 TABLET ORAL at 05:10

## 2024-10-09 NOTE — PROGRESS NOTES
"[facility] Medicine  Progress Note    Patient Name: Effie Tillman  MRN: [id number]  Patient Class: OP- Observation   Admission Date: 10/8/2024  Length of Stay: 0 days  Attending Physician: Hunter Tinoco MD  Primary Care Provider: Jennifer, Primary Doctor        Subjective:     Principal Problem:Epigastric pain        HPI:  39 year old female with past medical history of ovarian cyst present to the ED with complain of epigastric and right upper quadrant abdominal pain with associated nausea and vomiting that began last night. Patient reports it feels like she has "a knot in her abdomen". Reports normal bowel movements but notes she has not passed flatus since yesterday. Denies history of gastric ulcers or gastritis. Patient states she has had "too many" emesis episodes to count. Patient further endorses chest pain with associated dyspnea while in ED. Denies any past cardiac history. Reports history of heart burn but states current episode does not feel similar. No other aggravating/alleviating factors. Denies dysuria, urinary frequency, diarrhea, or other associated symptoms. Patient notes occasionally EtOH use. States last EtOH usage was 3 days ago. Patient denies illicit drug use.    In the ED, Labs K 3.4, otherwise unremarkable  CT abdomen and pelvis: Subcentimeter hypodensity within the lateral aspect of the posterior right lobe of the liver, too small to adequately characterize. Cholelithiasis.Subcentimeter hypodensity originating from the lower pole of the left kidney, likely a small cyst.Status post hysterectomy. 2.9 cm right adnexal cyst, likely a dominant follicle.Bilateral fat containing inguinal hernias.  CXR: showed Interstitial findings are accentuated by habitus and shallow inspiratory effort, no large focal consolidation.   EKG: with sinus bradycardia with ventricular rate of 57. MN interval 186. QRS 88.  milliseconds.       Overview/Hospital Course:  No notes on " file    Interval History: patient seen and examined. Consulted general surgery. Patient still having abdominal pain 2 to 3/10. Replace K as needed     Review of Systems   Respiratory:  Positive for shortness of breath.    Cardiovascular:  Positive for chest pain.   Gastrointestinal:  Positive for abdominal pain, nausea and vomiting.     Objective:     Vital Signs (Most Recent):  Temp: 98 °F (36.7 °C) (10/09/24 0500)  Pulse: 60 (10/09/24 0730)  Resp: 20 (10/09/24 1107)  BP: 122/88 (10/09/24 0730)  SpO2: 95 % (10/09/24 0730) Vital Signs (24h Range):  Temp:  [97.6 °F (36.4 °C)-98.5 °F (36.9 °C)] 98 °F (36.7 °C)  Pulse:  [] 60  Resp:  [13-21] 20  SpO2:  [94 %-100 %] 95 %  BP: (114-165)/(59-88) 122/88     Weight: 96.2 kg (212 lb)  Body mass index is 35.28 kg/m².    Intake/Output Summary (Last 24 hours) at 10/9/2024 1132  Last data filed at 10/9/2024 0155  Gross per 24 hour   Intake 740 ml   Output --   Net 740 ml         Physical Exam  Constitutional:       General: She is not in acute distress.     Appearance: She is obese. She is not ill-appearing.   HENT:      Head: Normocephalic and atraumatic.      Mouth/Throat:      Mouth: Mucous membranes are dry.   Eyes:      Extraocular Movements: Extraocular movements intact.   Cardiovascular:      Rate and Rhythm: Normal rate and regular rhythm.   Abdominal:      Tenderness: There is abdominal tenderness. There is no guarding.      Comments: Epigastric tenderness    Musculoskeletal:         General: Normal range of motion.      Cervical back: Normal range of motion.   Skin:     General: Skin is warm and dry.   Neurological:      Mental Status: She is alert and oriented to person, place, and time. Mental status is at baseline.   Psychiatric:         Mood and Affect: Mood normal.         Behavior: Behavior normal.         Thought Content: Thought content normal.         Judgment: Judgment normal.             Significant Labs: All pertinent labs within the past 24 hours  have been reviewed.    Significant Imaging: I have reviewed all pertinent imaging results/findings within the past 24 hours.    Assessment/Plan:      * Epigastric pain  GI rec: Rec stool softeners and collect stool culture d/t suspicion of food poisoning.   EGD:  - Esophagogastric landmarks identified.   - 2 cm hiatal hernia.   - Non-obstructing Schatzki ring.   - Erythematous mucosa in the prepyloric region of the stomach. Biopsied.  - Normal duodenal bulb, first portion of the duodenum and second portion of the duodenum.   Recommendation: - Await pathology results.  - Consider a general surgery consult for possible biliary colic.   Clear liquids  PPI   Pain control   Bowel regimen       Hypophosphatemia  Patient has Abnormal Phosphorus: hypophosphatemia. Will continue to monitor electrolytes closely. Will replace the affected electrolytes and repeat labs to be done after interventions completed. The patient's phosphorus results have been reviewed and are listed below.  Recent Labs   Lab 10/09/24  0345   PHOS 2.3*        Elevated blood pressure reading without diagnosis of hypertension  PTA not on medication  Likely secondary to abdominal pain and vomiting  BP controlled now       Sinus bradycardia  Tele monitoring  EKG as needed  Added TSH 0.807  T4 1.00 and T3 pending  Referral to sleep study outpatient         Hypokalemia  Patient's most recent potassium results are listed below.   Recent Labs     10/08/24  0934 10/09/24  0345   K 3.4* 3.2*       Plan  - Replete potassium per protocol  - Monitor potassium Daily  - Patient's hypokalemia is stable      Intractable nausea and vomiting  Antiemetics as needed         VTE Risk Mitigation (From admission, onward)           Ordered     enoxaparin injection 40 mg  Every 24 hours         10/08/24 1339     IP VTE HIGH RISK PATIENT  Once         10/08/24 1259     Place sequential compression device  Until discontinued         10/08/24 1259                    Discharge  Planning   JE:      Code Status: Full Code   Is the patient medically ready for discharge?:     Reason for patient still in hospital (select all that apply): Patient trending condition and Consult recommendations  Discharge Plan A: Home with family                  Elein Mirza NP  Department of Hospital Medicine   [facility] [place] - Emergency Dept

## 2024-10-09 NOTE — SUBJECTIVE & OBJECTIVE
Interval History: patient seen and examined. Consulted general surgery. Patient still having abdominal pain 2 to 3/10. Replace K as needed     Review of Systems   Respiratory:  Positive for shortness of breath.    Cardiovascular:  Positive for chest pain.   Gastrointestinal:  Positive for abdominal pain, nausea and vomiting.     Objective:     Vital Signs (Most Recent):  Temp: 98 °F (36.7 °C) (10/09/24 0500)  Pulse: 60 (10/09/24 0730)  Resp: 20 (10/09/24 1107)  BP: 122/88 (10/09/24 0730)  SpO2: 95 % (10/09/24 0730) Vital Signs (24h Range):  Temp:  [97.6 °F (36.4 °C)-98.5 °F (36.9 °C)] 98 °F (36.7 °C)  Pulse:  [] 60  Resp:  [13-21] 20  SpO2:  [94 %-100 %] 95 %  BP: (114-165)/(59-88) 122/88     Weight: 96.2 kg (212 lb)  Body mass index is 35.28 kg/m².    Intake/Output Summary (Last 24 hours) at 10/9/2024 1132  Last data filed at 10/9/2024 0155  Gross per 24 hour   Intake 740 ml   Output --   Net 740 ml         Physical Exam  Constitutional:       General: She is not in acute distress.     Appearance: She is obese. She is not ill-appearing.   HENT:      Head: Normocephalic and atraumatic.      Mouth/Throat:      Mouth: Mucous membranes are dry.   Eyes:      Extraocular Movements: Extraocular movements intact.   Cardiovascular:      Rate and Rhythm: Normal rate and regular rhythm.   Abdominal:      Tenderness: There is abdominal tenderness. There is no guarding.      Comments: Epigastric tenderness    Musculoskeletal:         General: Normal range of motion.      Cervical back: Normal range of motion.   Skin:     General: Skin is warm and dry.   Neurological:      Mental Status: She is alert and oriented to person, place, and time. Mental status is at baseline.   Psychiatric:         Mood and Affect: Mood normal.         Behavior: Behavior normal.         Thought Content: Thought content normal.         Judgment: Judgment normal.             Significant Labs: All pertinent labs within the past 24 hours have been  reviewed.    Significant Imaging: I have reviewed all pertinent imaging results/findings within the past 24 hours.

## 2024-10-09 NOTE — ASSESSMENT & PLAN NOTE
Tele monitoring  EKG as needed  Added TSH 0.807  T4 1.00 and T3 pending  Referral to sleep study outpatient

## 2024-10-09 NOTE — ANESTHESIA POSTPROCEDURE EVALUATION
Anesthesia Post Evaluation    Patient: Effie Tillman    Procedure(s) Performed: Procedure(s) (LRB):  EGD (ESOPHAGOGASTRODUODENOSCOPY) (N/A)    Final Anesthesia Type: general      Patient location during evaluation: GI PACU  Patient participation: Yes- Able to Participate  Level of consciousness: awake and alert and oriented  Post-procedure vital signs: reviewed and stable  Pain management: adequate  Airway patency: patent    PONV status at discharge: No PONV  Anesthetic complications: no      Cardiovascular status: hemodynamically stable and blood pressure returned to baseline  Respiratory status: spontaneous ventilation, room air and unassisted  Hydration status: euvolemic  Follow-up not needed.              Vitals Value Taken Time   /53 10/09/24 0832   Temp 36.7 °C (98 °F) 10/09/24 0500   Pulse 53 10/09/24 0846   Resp 16 10/09/24 0500   SpO2 95 % 10/09/24 0846   Vitals shown include unfiled device data.      Event Time   Out of Recovery 16:45:00         Pain/Milly Score: Pain Rating Prior to Med Admin: 0 (10/9/2024  5:15 AM)  Pain Rating Post Med Admin: 0 (10/9/2024  5:15 AM)  Milly Score: 10 (10/8/2024  3:36 PM)

## 2024-10-09 NOTE — PLAN OF CARE
OUSMANE attempted scheduling [facility] followup appt at [facility].  OUSMANE spoke with  who advised that Dr. Richardson is no longer at the facility  and that the patient was seeing LEÓN Cano then transferred  to a voice messaging system which asked to leave a message.  Voice message left requesting a one week hospital followup appointment with LEÓN Cano.

## 2024-10-09 NOTE — ASSESSMENT & PLAN NOTE
Patient has Abnormal Phosphorus: hypophosphatemia. Will continue to monitor electrolytes closely. Will replace the affected electrolytes and repeat labs to be done after interventions completed. The patient's phosphorus results have been reviewed and are listed below.  Recent Labs   Lab 10/09/24  0345   PHOS 2.3*

## 2024-10-09 NOTE — NURSING
[facility] [place]  Nurses Note -- 4 Eyes      10/8/2024       Skin assessed on: Q Shift      [x] No Pressure Injuries Present    [x]Prevention Measures Documented    [] Yes LDA  for Pressure Injury Previously documented     [] Yes New Pressure Injury Discovered   [] LDA for New Pressure Injury Added      Attending RN:  Sonia England, RN     Second RN:  Claudia DAVIS LPN

## 2024-10-09 NOTE — ASSESSMENT & PLAN NOTE
Patient's most recent potassium results are listed below.   Recent Labs     10/08/24  0934 10/09/24  0345   K 3.4* 3.2*       Plan  - Replete potassium per protocol  - Monitor potassium Daily  - Patient's hypokalemia is stable

## 2024-10-09 NOTE — ED NOTES
Patient reports of epigastric pain. Ranking it 6/10 on the pain scale. Provider, BRENDA Hernandez MD, notified and PRN medications administered PO. Care ongoing

## 2024-10-09 NOTE — ASSESSMENT & PLAN NOTE
GI rec: Rec stool softeners and collect stool culture d/t suspicion of food poisoning.   EGD:  - Esophagogastric landmarks identified.   - 2 cm hiatal hernia.   - Non-obstructing Schatzki ring.   - Erythematous mucosa in the prepyloric region of the stomach. Biopsied.  - Normal duodenal bulb, first portion of the duodenum and second portion of the duodenum.   Recommendation: - Await pathology results.  - Consider a general surgery consult for possible biliary colic.   Clear liquids  PPI   Pain control   Bowel regimen

## 2024-10-09 NOTE — ED NOTES
NPO challenge started with clear liquids. Patient able to drink half of her broth and small sips. Tolerating well

## 2024-10-10 ENCOUNTER — ANESTHESIA EVENT (OUTPATIENT)
Dept: SURGERY | Facility: HOSPITAL | Age: 39
End: 2024-10-10
Payer: MEDICAID

## 2024-10-10 ENCOUNTER — ANESTHESIA (OUTPATIENT)
Dept: SURGERY | Facility: HOSPITAL | Age: 39
End: 2024-10-10
Payer: MEDICAID

## 2024-10-10 LAB
ANION GAP SERPL CALC-SCNC: 7 MMOL/L (ref 8–16)
BUN SERPL-MCNC: 7 MG/DL (ref 6–20)
CALCIUM SERPL-MCNC: 8.4 MG/DL (ref 8.7–10.5)
CHLORIDE SERPL-SCNC: 104 MMOL/L (ref 95–110)
CO2 SERPL-SCNC: 26 MMOL/L (ref 23–29)
CREAT SERPL-MCNC: 0.9 MG/DL (ref 0.5–1.4)
E COLI SXT1 STL QL IA: NEGATIVE
E COLI SXT2 STL QL IA: NEGATIVE
ERYTHROCYTE [DISTWIDTH] IN BLOOD BY AUTOMATED COUNT: 13.5 % (ref 11.5–14.5)
EST. GFR  (NO RACE VARIABLE): >60 ML/MIN/1.73 M^2
GLUCOSE SERPL-MCNC: 96 MG/DL (ref 70–110)
HCT VFR BLD AUTO: 36.2 % (ref 37–48.5)
HGB BLD-MCNC: 11.2 G/DL (ref 12–16)
MAGNESIUM SERPL-MCNC: 1.9 MG/DL (ref 1.6–2.6)
MCH RBC QN AUTO: 27 PG (ref 27–31)
MCHC RBC AUTO-ENTMCNC: 30.9 G/DL (ref 32–36)
MCV RBC AUTO: 87 FL (ref 82–98)
PHOSPHATE SERPL-MCNC: 3.1 MG/DL (ref 2.7–4.5)
PLATELET # BLD AUTO: 297 K/UL (ref 150–450)
PMV BLD AUTO: 9.7 FL (ref 9.2–12.9)
POTASSIUM SERPL-SCNC: 3.2 MMOL/L (ref 3.5–5.1)
RBC # BLD AUTO: 4.15 M/UL (ref 4–5.4)
SODIUM SERPL-SCNC: 137 MMOL/L (ref 136–145)
WBC # BLD AUTO: 9.31 K/UL (ref 3.9–12.7)

## 2024-10-10 PROCEDURE — 63600175 PHARM REV CODE 636 W HCPCS: Performed by: SURGERY

## 2024-10-10 PROCEDURE — 36415 COLL VENOUS BLD VENIPUNCTURE: CPT | Performed by: NURSE PRACTITIONER

## 2024-10-10 PROCEDURE — 37000009 HC ANESTHESIA EA ADD 15 MINS: Performed by: SURGERY

## 2024-10-10 PROCEDURE — 63600175 PHARM REV CODE 636 W HCPCS: Performed by: NURSE PRACTITIONER

## 2024-10-10 PROCEDURE — 84100 ASSAY OF PHOSPHORUS: CPT | Performed by: NURSE PRACTITIONER

## 2024-10-10 PROCEDURE — 27201423 OPTIME MED/SURG SUP & DEVICES STERILE SUPPLY: Performed by: SURGERY

## 2024-10-10 PROCEDURE — 47562 LAPAROSCOPIC CHOLECYSTECTOMY: CPT | Mod: ,,, | Performed by: SURGERY

## 2024-10-10 PROCEDURE — 25000003 PHARM REV CODE 250: Performed by: STUDENT IN AN ORGANIZED HEALTH CARE EDUCATION/TRAINING PROGRAM

## 2024-10-10 PROCEDURE — 37000008 HC ANESTHESIA 1ST 15 MINUTES: Performed by: SURGERY

## 2024-10-10 PROCEDURE — 25000003 PHARM REV CODE 250: Performed by: NURSE PRACTITIONER

## 2024-10-10 PROCEDURE — 96375 TX/PRO/DX INJ NEW DRUG ADDON: CPT

## 2024-10-10 PROCEDURE — 80048 BASIC METABOLIC PNL TOTAL CA: CPT | Performed by: NURSE PRACTITIONER

## 2024-10-10 PROCEDURE — 25000003 PHARM REV CODE 250: Performed by: SURGERY

## 2024-10-10 PROCEDURE — 36000711: Performed by: SURGERY

## 2024-10-10 PROCEDURE — 83735 ASSAY OF MAGNESIUM: CPT | Performed by: NURSE PRACTITIONER

## 2024-10-10 PROCEDURE — 96372 THER/PROPH/DIAG INJ SC/IM: CPT | Performed by: NURSE PRACTITIONER

## 2024-10-10 PROCEDURE — 25000003 PHARM REV CODE 250

## 2024-10-10 PROCEDURE — G0378 HOSPITAL OBSERVATION PER HR: HCPCS

## 2024-10-10 PROCEDURE — 96376 TX/PRO/DX INJ SAME DRUG ADON: CPT

## 2024-10-10 PROCEDURE — 63600175 PHARM REV CODE 636 W HCPCS: Performed by: ANESTHESIOLOGY

## 2024-10-10 PROCEDURE — 63600175 PHARM REV CODE 636 W HCPCS: Performed by: STUDENT IN AN ORGANIZED HEALTH CARE EDUCATION/TRAINING PROGRAM

## 2024-10-10 PROCEDURE — 88304 TISSUE EXAM BY PATHOLOGIST: CPT | Performed by: PATHOLOGY

## 2024-10-10 PROCEDURE — 36000710: Performed by: SURGERY

## 2024-10-10 PROCEDURE — 71000033 HC RECOVERY, INTIAL HOUR: Performed by: SURGERY

## 2024-10-10 PROCEDURE — 71000039 HC RECOVERY, EACH ADD'L HOUR: Performed by: SURGERY

## 2024-10-10 PROCEDURE — 85027 COMPLETE CBC AUTOMATED: CPT | Performed by: NURSE PRACTITIONER

## 2024-10-10 RX ORDER — PROPOFOL 10 MG/ML
VIAL (ML) INTRAVENOUS
Status: DISCONTINUED | OUTPATIENT
Start: 2024-10-10 | End: 2024-10-10

## 2024-10-10 RX ORDER — SCOLOPAMINE TRANSDERMAL SYSTEM 1 MG/1
PATCH, EXTENDED RELEASE TRANSDERMAL
Status: DISCONTINUED | OUTPATIENT
Start: 2024-10-10 | End: 2024-10-10

## 2024-10-10 RX ORDER — SODIUM CHLORIDE 0.9 % (FLUSH) 0.9 %
10 SYRINGE (ML) INJECTION
Status: DISCONTINUED | OUTPATIENT
Start: 2024-10-10 | End: 2024-10-10 | Stop reason: HOSPADM

## 2024-10-10 RX ORDER — LIDOCAINE HYDROCHLORIDE 20 MG/ML
INJECTION INTRAVENOUS
Status: DISCONTINUED | OUTPATIENT
Start: 2024-10-10 | End: 2024-10-10

## 2024-10-10 RX ORDER — HYDROMORPHONE HYDROCHLORIDE 2 MG/ML
0.2 INJECTION, SOLUTION INTRAMUSCULAR; INTRAVENOUS; SUBCUTANEOUS EVERY 5 MIN PRN
Status: DISCONTINUED | OUTPATIENT
Start: 2024-10-10 | End: 2024-10-10 | Stop reason: HOSPADM

## 2024-10-10 RX ORDER — BUPIVACAINE HYDROCHLORIDE AND EPINEPHRINE 2.5; 5 MG/ML; UG/ML
INJECTION, SOLUTION EPIDURAL; INFILTRATION; INTRACAUDAL; PERINEURAL
Status: DISCONTINUED | OUTPATIENT
Start: 2024-10-10 | End: 2024-10-10 | Stop reason: HOSPADM

## 2024-10-10 RX ORDER — HALOPERIDOL 5 MG/ML
0.5 INJECTION INTRAMUSCULAR EVERY 10 MIN PRN
Status: DISCONTINUED | OUTPATIENT
Start: 2024-10-10 | End: 2024-10-10 | Stop reason: HOSPADM

## 2024-10-10 RX ORDER — MIDAZOLAM HYDROCHLORIDE 1 MG/ML
INJECTION INTRAMUSCULAR; INTRAVENOUS
Status: DISCONTINUED | OUTPATIENT
Start: 2024-10-10 | End: 2024-10-10

## 2024-10-10 RX ORDER — FENTANYL CITRATE 50 UG/ML
INJECTION, SOLUTION INTRAMUSCULAR; INTRAVENOUS
Status: DISCONTINUED | OUTPATIENT
Start: 2024-10-10 | End: 2024-10-10

## 2024-10-10 RX ORDER — POTASSIUM CHLORIDE 20 MEQ/1
40 TABLET, EXTENDED RELEASE ORAL ONCE
Status: COMPLETED | OUTPATIENT
Start: 2024-10-10 | End: 2024-10-10

## 2024-10-10 RX ORDER — GLUCAGON 1 MG
1 KIT INJECTION
Status: DISCONTINUED | OUTPATIENT
Start: 2024-10-10 | End: 2024-10-10 | Stop reason: HOSPADM

## 2024-10-10 RX ORDER — KETOROLAC TROMETHAMINE 30 MG/ML
INJECTION, SOLUTION INTRAMUSCULAR; INTRAVENOUS
Status: DISCONTINUED | OUTPATIENT
Start: 2024-10-10 | End: 2024-10-10

## 2024-10-10 RX ORDER — DEXAMETHASONE SODIUM PHOSPHATE 4 MG/ML
INJECTION, SOLUTION INTRA-ARTICULAR; INTRALESIONAL; INTRAMUSCULAR; INTRAVENOUS; SOFT TISSUE
Status: DISCONTINUED | OUTPATIENT
Start: 2024-10-10 | End: 2024-10-10

## 2024-10-10 RX ORDER — ONDANSETRON HYDROCHLORIDE 2 MG/ML
4 INJECTION, SOLUTION INTRAVENOUS DAILY PRN
Status: DISCONTINUED | OUTPATIENT
Start: 2024-10-10 | End: 2024-10-10 | Stop reason: HOSPADM

## 2024-10-10 RX ORDER — INDOCYANINE GREEN AND WATER 25 MG
1.25 KIT INJECTION ONCE
Status: COMPLETED | OUTPATIENT
Start: 2024-10-10 | End: 2024-10-10

## 2024-10-10 RX ORDER — ROCURONIUM BROMIDE 10 MG/ML
INJECTION, SOLUTION INTRAVENOUS
Status: DISCONTINUED | OUTPATIENT
Start: 2024-10-10 | End: 2024-10-10

## 2024-10-10 RX ORDER — ONDANSETRON HYDROCHLORIDE 2 MG/ML
INJECTION, SOLUTION INTRAVENOUS
Status: DISCONTINUED | OUTPATIENT
Start: 2024-10-10 | End: 2024-10-10

## 2024-10-10 RX ORDER — PHENYLEPHRINE HYDROCHLORIDE 10 MG/ML
INJECTION INTRAVENOUS
Status: DISCONTINUED | OUTPATIENT
Start: 2024-10-10 | End: 2024-10-10

## 2024-10-10 RX ORDER — POTASSIUM CHLORIDE 20 MEQ/1
40 TABLET, EXTENDED RELEASE ORAL ONCE
Status: DISCONTINUED | OUTPATIENT
Start: 2024-10-10 | End: 2024-10-10

## 2024-10-10 RX ADMIN — HYDROMORPHONE HYDROCHLORIDE 0.2 MG: 2 INJECTION, SOLUTION INTRAMUSCULAR; INTRAVENOUS; SUBCUTANEOUS at 01:10

## 2024-10-10 RX ADMIN — ROCURONIUM BROMIDE 50 MG: 10 INJECTION, SOLUTION INTRAVENOUS at 11:10

## 2024-10-10 RX ADMIN — DOCUSATE SODIUM 100 MG: 100 CAPSULE, LIQUID FILLED ORAL at 09:10

## 2024-10-10 RX ADMIN — HYDROMORPHONE HYDROCHLORIDE 0.2 MG: 2 INJECTION, SOLUTION INTRAMUSCULAR; INTRAVENOUS; SUBCUTANEOUS at 02:10

## 2024-10-10 RX ADMIN — PANTOPRAZOLE SODIUM 40 MG: 40 INJECTION, POWDER, FOR SOLUTION INTRAVENOUS at 09:10

## 2024-10-10 RX ADMIN — FENTANYL CITRATE 100 MCG: 0.05 INJECTION, SOLUTION INTRAMUSCULAR; INTRAVENOUS at 11:10

## 2024-10-10 RX ADMIN — MIDAZOLAM HYDROCHLORIDE 2 MG: 1 INJECTION INTRAMUSCULAR; INTRAVENOUS at 11:10

## 2024-10-10 RX ADMIN — SCOPALAMINE 1 PATCH: 1 PATCH, EXTENDED RELEASE TRANSDERMAL at 11:10

## 2024-10-10 RX ADMIN — PHENYLEPHRINE HYDROCHLORIDE 100 MCG: 10 INJECTION INTRAVENOUS at 11:10

## 2024-10-10 RX ADMIN — KETOROLAC TROMETHAMINE 30 MG: 30 INJECTION, SOLUTION INTRAMUSCULAR at 12:10

## 2024-10-10 RX ADMIN — OXYCODONE HYDROCHLORIDE 5 MG: 5 TABLET ORAL at 06:10

## 2024-10-10 RX ADMIN — PHENYLEPHRINE HYDROCHLORIDE 100 MCG: 10 INJECTION INTRAVENOUS at 12:10

## 2024-10-10 RX ADMIN — SODIUM CHLORIDE: 0.9 INJECTION, SOLUTION INTRAVENOUS at 11:10

## 2024-10-10 RX ADMIN — POTASSIUM CHLORIDE 40 MEQ: 1500 TABLET, EXTENDED RELEASE ORAL at 09:10

## 2024-10-10 RX ADMIN — PROCHLORPERAZINE EDISYLATE 5 MG: 5 INJECTION INTRAMUSCULAR; INTRAVENOUS at 05:10

## 2024-10-10 RX ADMIN — INDOCYANINE GREEN AND WATER 1.25 MG: KIT at 11:10

## 2024-10-10 RX ADMIN — ONDANSETRON 4 MG: 2 INJECTION, SOLUTION INTRAMUSCULAR; INTRAVENOUS at 11:10

## 2024-10-10 RX ADMIN — LIDOCAINE HYDROCHLORIDE 100 MG: 20 INJECTION, SOLUTION INTRAVENOUS at 11:10

## 2024-10-10 RX ADMIN — ROCURONIUM BROMIDE 30 MG: 10 INJECTION, SOLUTION INTRAVENOUS at 12:10

## 2024-10-10 RX ADMIN — ENOXAPARIN SODIUM 40 MG: 40 INJECTION SUBCUTANEOUS at 05:10

## 2024-10-10 RX ADMIN — PANTOPRAZOLE SODIUM 40 MG: 40 INJECTION, POWDER, FOR SOLUTION INTRAVENOUS at 08:10

## 2024-10-10 RX ADMIN — CEFAZOLIN 2 G: 2 INJECTION, POWDER, FOR SOLUTION INTRAMUSCULAR; INTRAVENOUS at 11:10

## 2024-10-10 RX ADMIN — SUGAMMADEX 200 MG: 100 INJECTION, SOLUTION INTRAVENOUS at 01:10

## 2024-10-10 RX ADMIN — GLYCOPYRROLATE 0.2 MG: 0.2 INJECTION, SOLUTION INTRAMUSCULAR; INTRAVITREAL at 12:10

## 2024-10-10 RX ADMIN — PROPOFOL 200 MG: 10 INJECTION, EMULSION INTRAVENOUS at 11:10

## 2024-10-10 RX ADMIN — DEXAMETHASONE SODIUM PHOSPHATE 4 MG: 4 INJECTION, SOLUTION INTRAMUSCULAR; INTRAVENOUS at 11:10

## 2024-10-10 RX ADMIN — OXYCODONE HYDROCHLORIDE 5 MG: 5 TABLET ORAL at 03:10

## 2024-10-10 NOTE — PLAN OF CARE
Sitting in bed. Patient has 3 dsg cdi to abdomen post op. C/O of nausea after clear liquid diet, no vomiting noted. Gave pain medication and nausea medications post op per request. Call button within reach. Family at bedside.     Problem: Adult Inpatient Plan of Care  Goal: Plan of Care Review  Outcome: Progressing  Goal: Optimal Comfort and Wellbeing  Outcome: Progressing     Problem: Pain Acute  Goal: Optimal Pain Control and Function  Outcome: Progressing     Problem: Wound  Goal: Optimal Coping  Outcome: Progressing  Goal: Absence of Infection Signs and Symptoms  Outcome: Progressing

## 2024-10-10 NOTE — NURSING
[facility], [facility]  Nurses Note -- 4 Eyes      10/10/2024       Skin assessed on: Q Shift      [x] No Pressure Injuries Present    [x]Prevention Measures Documented    [] Yes LDA  for Pressure Injury Previously documented     [] Yes New Pressure Injury Discovered   [] LDA for New Pressure Injury Added      Attending RN:  Radha Mejia RN     Second RN:  Bridgette

## 2024-10-10 NOTE — OP NOTE
.Laparoscopic Robotically assisted Cholecystectomy Procedure Note    Pre-operative Diagnosis: Calculus of gallbladder with acute cholecystitis, without mention of obstruction    Post-operative Diagnosis: Same    Surgery Date: 10/10/2024     Surgeon: Vargas Phipps     Assistants: Dr. Bruce Diamond    Anesthesia: General endotracheal anesthesia    Indications: This patient presents with symptomatic gallbladder disease and will undergo laparoscopic cholecystectomy.    The patient was seen again in the Holding Room. The risks and benefits, including but not limited to common bile duct injury, cystic duct stump leak, bleeding and infection were explained to the patient, who acknowledges these risks and wishes to proceed.    Procedure Details   The patient was taken to Operating Room, identified as Effie Tillman and the procedure verified as Laparoscopic Cholecystectomy. A Time Out was held and the above information confirmed.    Prior to the induction of general anesthesia, antibiotic prophylaxis was administered. General endotracheal anesthesia was then administered and tolerated well. After the induction, the abdomen was prepped in the usual sterile fashion. The patient was positioned in the supine position with the arms extended.    A veress needle was used to access the patient's abdomen.  Gas was insufflated, establishing a pneumoperitoneum. An optiview trocar was placed.  A 30 degree, 5 mm scope was then placed through the trocar and the abdominal cavity was explored.    The underlying bowel was inspected and found to be free of injury.  The remaining ports were placed under direct visualization.  All were 8 mm ports. The robot was docked to the trocar     The gallbladder was grasped and retracted cranially and laterally exposing the triangle of Calot.  Dissection began on the neck of the gallbladder and continued proximally until a ductal structure was identified. ICG was used to confirm anatomy.  A  critical anterior and posterior view of the duct was obtained to confirm it was the cystic duct.  It was then doubly clipped and transected.  Continued dissection demonstrated the cystic artery. It was similarly clipped and transected.  The gallbladder was then dissected from the liver bed using electrocautery and removed through the umbilical trocar.  The dissection area was then inspected for hemostasis which was evident.  The dissection area was also inspected for a bile leak and bowel injury which was not evident.  The pneumoperitoneum was then expelled from the abdomen, and all ports were removed.  The umbilical fascia was reapproximated with a single figure-of-eight 0 vicryl suture.  The skin over all incisions were closed using 4-0 Vicryl deep dermal sutures.  A sterile dressing was applied.    Instrument, sponge, and needle counts were correct at closure and at the conclusion of the case.  There were no immediate complications.    Findings:  Cholecystitis with Cholelithiasis    Estimated Blood Loss: Minimal           Drains: none           Total IV Fluids: see anesthesia records           Specimens: Gallbladder             Complications: None; patient tolerated the procedure well.           Disposition: PACU - hemodynamically stable.           Condition: stable

## 2024-10-10 NOTE — PLAN OF CARE
Call back received from [facility] with date and time of patient's followup appointment.     Follow-up Information       No, Primary Doctor Follow up.               [facility]. Go on 10/16/2024.    Why: Dr. Richardson is no longer at [facility].  You will be seen by LEÓN Cano on Wednesday, October 16, 2024 at 11:00 a.m.  Contact information:  Gagan PATI Erickson  [phone]

## 2024-10-10 NOTE — ASSESSMENT & PLAN NOTE
GI rec: Rec stool softeners and collect stool culture d/t suspicion of food poisoning.   EGD:  - Esophagogastric landmarks identified.   - 2 cm hiatal hernia.   - Non-obstructing Schatzki ring.   - Erythematous mucosa in the prepyloric region of the stomach. Biopsied.  - Normal duodenal bulb, first portion of the duodenum and second portion of the duodenum.   Recommendation: - Await pathology results.  - Consider a general surgery consult for possible biliary colic.   PPI   Pain control   Bowel regimen   Consulted general surgery: possible lap kia today

## 2024-10-10 NOTE — NURSING
Patient transferred to surgery for cholecystectomy via stretcher with patient transport. Denies distress.

## 2024-10-10 NOTE — NURSING
"[facility], [facility]  Nurses Note -- 4 Eyes    Admitted pt from ER, AAOx4, Vital signs stable No complaints of Nausea and Vomiting, dizziness nor SOB. Pt stated headache 5/10, PRN pain med given at ED, RUQ abdomen and epigastric "feels only sore" per pt. Pt aware for plan of care, instructed NPO MN and verbalized understanding. Bed in low position, wheels locked. Side rails up. Call light within reach.    10/9/2024       Skin assessed on: Admit      [x] No Pressure Injuries Present    [x]Prevention Measures Documented    [] Yes LDA  for Pressure Injury Previously documented     [] Yes New Pressure Injury Discovered   [] LDA for New Pressure Injury Added      Attending RN:  Larissa Angeles RN     Second RN:  MATTY Guillory      "

## 2024-10-10 NOTE — PROGRESS NOTES
Surgery Progress Note    Effie Tillman is a 39 y.o. year old female in hospital for sudden onset severe upper abdominal pain that began after eating pizza on 10/7. She reports associated chills and vomiting. Patient initially believed she was having indigestion but got no relief from Mylanta. She reports she has never had an episode like this before. No significant PMH, no daily meds, PSH laparoscopic hysterectomy/BSO.    NAEON  AF  HDS  Persistent upper abdominal pain  NPO since midnight  Passing flatus  No f/c/n/v/sob/cp    ROS:  Negative except above    PE:  Vitals:    10/09/24 2304 10/10/24 0343 10/10/24 0443 10/10/24 0634   BP:   101/62    BP Location:   Left arm    Patient Position:   Lying    Pulse: 63 71 (!) 59    Resp:   17 18   Temp:   98.6 °F (37 °C)    TempSrc:   Oral    SpO2:   97%    Weight:       Height:           NAD  AOx4  Normal WOB on RA  Regular HR  Abd soft, nd tender to mid-epigastrium and RUQ, +Avendaño    CBC  9.31 11.2 297    36.2     Coag                 BMP  137 104 7 96   3.2 26 0.9     CaMgPhos  8.4   1.9   3.1      Last labs from current encounter as of 10/10/2024-7:07 AM        Significant Diagnostic Studies:   CT AP - cholelithiasis, bilateral fat-containing inguinal hernias  US RUQ - cholelithiasis, sludge wall thickening to 4.7 mm    A/P:  Effie Tillman is a 39 y.o. year old female  with who presents with sudden onset abdominal pain, nausea, vomiting, and chills. Presentation and imaging findings suspicious for symptomatic cholelithiasis. LFTs and WBC wnl. Patient expressed interest in cholecystectomy sooner rather than later.     - Possible lap kia today pending OR availability  - NPO  - Will obtain informed consent  - Follow up final read of RUQ US  - Will continue to follow  - Remainder of care per primary team    Bruce Diamond  General Surgery - [facility]  10/10/2024

## 2024-10-10 NOTE — ASSESSMENT & PLAN NOTE
Patient's most recent potassium results are listed below.   Recent Labs     10/09/24  0345 10/09/24  1505 10/10/24  0458   K 3.2* 3.6 3.2*       Plan  - Replete potassium per protocol  - Monitor potassium Daily  - Patient's hypokalemia is stable

## 2024-10-10 NOTE — PROGRESS NOTES
"[facility] Medicine  Progress Note    Patient Name: Effie Tillman  MRN: [id number]  Patient Class: OP- Observation   Admission Date: 10/8/2024  Length of Stay: 0 days  Attending Physician: Hunter Tinoco MD  Primary Care Provider: Jennifer, Primary Doctor        Subjective:     Principal Problem:Epigastric pain        HPI:  39 year old female with past medical history of ovarian cyst present to the ED with complain of epigastric and right upper quadrant abdominal pain with associated nausea and vomiting that began last night. Patient reports it feels like she has "a knot in her abdomen". Reports normal bowel movements but notes she has not passed flatus since yesterday. Denies history of gastric ulcers or gastritis. Patient states she has had "too many" emesis episodes to count. Patient further endorses chest pain with associated dyspnea while in ED. Denies any past cardiac history. Reports history of heart burn but states current episode does not feel similar. No other aggravating/alleviating factors. Denies dysuria, urinary frequency, diarrhea, or other associated symptoms. Patient notes occasionally EtOH use. States last EtOH usage was 3 days ago. Patient denies illicit drug use.    In the ED, Labs K 3.4, otherwise unremarkable  CT abdomen and pelvis: Subcentimeter hypodensity within the lateral aspect of the posterior right lobe of the liver, too small to adequately characterize. Cholelithiasis.Subcentimeter hypodensity originating from the lower pole of the left kidney, likely a small cyst.Status post hysterectomy. 2.9 cm right adnexal cyst, likely a dominant follicle.Bilateral fat containing inguinal hernias.  CXR: showed Interstitial findings are accentuated by habitus and shallow inspiratory effort, no large focal consolidation.   EKG: with sinus bradycardia with ventricular rate of 57. WV interval 186. QRS 88.  milliseconds.       Overview/Hospital Course:  No notes on file    Interval " History: patient seen and examined.   Persistent upper abdominal pain, passing flatus. Patient having lap kia today.   Review of Systems   Respiratory:  Positive for shortness of breath.    Cardiovascular:  Positive for chest pain.   Gastrointestinal:  Positive for abdominal pain, nausea and vomiting.     Objective:     Vital Signs (Most Recent):  Temp: 97.4 °F (36.3 °C) (10/10/24 0707)  Pulse: 62 (10/10/24 0723)  Resp: 18 (10/10/24 0707)  BP: (!) 109/56 (10/10/24 0707)  SpO2: 98 % (10/10/24 0707) Vital Signs (24h Range):  Temp:  [97.4 °F (36.3 °C)-98.9 °F (37.2 °C)] 97.4 °F (36.3 °C)  Pulse:  [55-78] 62  Resp:  [17-19] 18  SpO2:  [95 %-99 %] 98 %  BP: (101-156)/(56-81) 109/56     Weight: 100.2 kg (220 lb 14.4 oz)  Body mass index is 36.76 kg/m².    Intake/Output Summary (Last 24 hours) at 10/10/2024 1234  Last data filed at 10/10/2024 1151  Gross per 24 hour   Intake 50 ml   Output --   Net 50 ml         Physical Exam  Constitutional:       General: She is not in acute distress.     Appearance: She is obese. She is not ill-appearing.   HENT:      Head: Normocephalic and atraumatic.      Mouth/Throat:      Mouth: Mucous membranes are dry.   Eyes:      Extraocular Movements: Extraocular movements intact.   Cardiovascular:      Rate and Rhythm: Normal rate and regular rhythm.   Abdominal:      Tenderness: There is abdominal tenderness. There is no guarding.      Comments: Epigastric tenderness    Musculoskeletal:         General: Normal range of motion.      Cervical back: Normal range of motion.   Skin:     General: Skin is warm and dry.   Neurological:      Mental Status: She is alert and oriented to person, place, and time. Mental status is at baseline.   Psychiatric:         Mood and Affect: Mood normal.         Behavior: Behavior normal.         Thought Content: Thought content normal.         Judgment: Judgment normal.             Significant Labs: All pertinent labs within the past 24 hours have been  reviewed.    Significant Imaging: I have reviewed all pertinent imaging results/findings within the past 24 hours.    Assessment/Plan:      * Epigastric pain  GI rec: Rec stool softeners and collect stool culture d/t suspicion of food poisoning.   EGD:  - Esophagogastric landmarks identified.   - 2 cm hiatal hernia.   - Non-obstructing Schatzki ring.   - Erythematous mucosa in the prepyloric region of the stomach. Biopsied.  - Normal duodenal bulb, first portion of the duodenum and second portion of the duodenum.   Recommendation: - Await pathology results.  - Consider a general surgery consult for possible biliary colic.   PPI   Pain control   Bowel regimen   Consulted general surgery: possible lap kia today      Hypophosphatemia  Patient has Abnormal Phosphorus: hypophosphatemia. Will continue to monitor electrolytes closely. Will replace the affected electrolytes and repeat labs to be done after interventions completed. The patient's phosphorus results have been reviewed and are listed below.  Recent Labs   Lab 10/10/24  0458   PHOS 3.1          Elevated blood pressure reading without diagnosis of hypertension  PTA not on medication  Likely secondary to abdominal pain and vomiting  BP controlled now       Sinus bradycardia  Tele monitoring  EKG as needed  Added TSH 0.807  T4 1.00 and T3 pending  Referral to sleep study outpatient         Hypokalemia  Patient's most recent potassium results are listed below.   Recent Labs     10/09/24  0345 10/09/24  1505 10/10/24  0458   K 3.2* 3.6 3.2*       Plan  - Replete potassium per protocol  - Monitor potassium Daily  - Patient's hypokalemia is stable      Intractable nausea and vomiting  Antiemetics as needed         VTE Risk Mitigation (From admission, onward)           Ordered     enoxaparin injection 40 mg  Every 24 hours         10/08/24 1339     IP VTE HIGH RISK PATIENT  Once         10/08/24 1259     Place sequential compression device  Until discontinued          10/08/24 1259                    Discharge Planning   JE:      Code Status: Full Code   Is the patient medically ready for discharge?:     Reason for patient still in hospital (select all that apply): Patient trending condition and Consult recommendations  Discharge Plan A: Home with family                  Eleni Mirza NP  Department of [facility] Medicine   [facility]

## 2024-10-10 NOTE — PLAN OF CARE
Problem: Adult Inpatient Plan of Care  Goal: Absence of Hospital-Acquired Illness or Injury  Outcome: Progressing  Intervention: Identify and Manage Fall Risk  Flowsheets (Taken 10/10/2024 0331)  Safety Promotion/Fall Prevention:   assistive device/personal item within reach   side rails raised x 2   lighting adjusted   medications reviewed   room near unit station  Intervention: Prevent Skin Injury  Flowsheets (Taken 10/10/2024 0331)  Body Position: position changed independently  Intervention: Prevent and Manage VTE (Venous Thromboembolism) Risk  Flowsheets (Taken 10/10/2024 0331)  VTE Prevention/Management: ambulation promoted     Problem: Nausea and Vomiting  Goal: Nausea and Vomiting Relief  Outcome: Progressing

## 2024-10-10 NOTE — SUBJECTIVE & OBJECTIVE
Interval History: patient seen and examined.   Persistent upper abdominal pain, passing flatus. Patient having lap kia today.   Review of Systems   Respiratory:  Positive for shortness of breath.    Cardiovascular:  Positive for chest pain.   Gastrointestinal:  Positive for abdominal pain, nausea and vomiting.     Objective:     Vital Signs (Most Recent):  Temp: 97.4 °F (36.3 °C) (10/10/24 0707)  Pulse: 62 (10/10/24 0723)  Resp: 18 (10/10/24 0707)  BP: (!) 109/56 (10/10/24 0707)  SpO2: 98 % (10/10/24 0707) Vital Signs (24h Range):  Temp:  [97.4 °F (36.3 °C)-98.9 °F (37.2 °C)] 97.4 °F (36.3 °C)  Pulse:  [55-78] 62  Resp:  [17-19] 18  SpO2:  [95 %-99 %] 98 %  BP: (101-156)/(56-81) 109/56     Weight: 100.2 kg (220 lb 14.4 oz)  Body mass index is 36.76 kg/m².    Intake/Output Summary (Last 24 hours) at 10/10/2024 1234  Last data filed at 10/10/2024 1151  Gross per 24 hour   Intake 50 ml   Output --   Net 50 ml         Physical Exam  Constitutional:       General: She is not in acute distress.     Appearance: She is obese. She is not ill-appearing.   HENT:      Head: Normocephalic and atraumatic.      Mouth/Throat:      Mouth: Mucous membranes are dry.   Eyes:      Extraocular Movements: Extraocular movements intact.   Cardiovascular:      Rate and Rhythm: Normal rate and regular rhythm.   Abdominal:      Tenderness: There is abdominal tenderness. There is no guarding.      Comments: Epigastric tenderness    Musculoskeletal:         General: Normal range of motion.      Cervical back: Normal range of motion.   Skin:     General: Skin is warm and dry.   Neurological:      Mental Status: She is alert and oriented to person, place, and time. Mental status is at baseline.   Psychiatric:         Mood and Affect: Mood normal.         Behavior: Behavior normal.         Thought Content: Thought content normal.         Judgment: Judgment normal.             Significant Labs: All pertinent labs within the past 24 hours have been  reviewed.    Significant Imaging: I have reviewed all pertinent imaging results/findings within the past 24 hours.

## 2024-10-10 NOTE — NURSING
Audrey updated on patients transfer and patient placed on cardiac monitor #[id number]. Awaiting transport.

## 2024-10-10 NOTE — NURSING
Patient transported to room via stretcher. Has 3 surgical incision to abdomen with dsg cdi, c/o of pain to abdomen 7/10, sn administer prn oxycodone. No problems noted.

## 2024-10-10 NOTE — TRANSFER OF CARE
"Anesthesia Transfer of Care Note    Patient: Effie Tillman    Procedure(s) Performed: Procedure(s) (LRB):  ROBOTIC CHOLECYSTECTOMY (N/A)    Patient location: PACU    Anesthesia Type: general    Transport from OR: Transported from OR on 6-10 L/min O2 by face mask with adequate spontaneous ventilation    Post pain: adequate analgesia    Post vital signs: stable    Level of consciousness: responds to stimulation and sedated    Nausea/Vomiting: no nausea/vomiting    Complications: none    Transfer of care protocol was followed      Last vitals: Visit Vitals  /62 (BP Location: Left arm, Patient Position: Lying)   Pulse (!) 55   Temp 36.6 °C (97.8 °F) (Temporal)   Resp 18   Ht 5' 5" (1.651 m)   Wt 100.2 kg (220 lb 14.4 oz)   LMP 07/10/2019   SpO2 100%   Breastfeeding No   BMI 36.76 kg/m²     "

## 2024-10-10 NOTE — ASSESSMENT & PLAN NOTE
Patient has Abnormal Phosphorus: hypophosphatemia. Will continue to monitor electrolytes closely. Will replace the affected electrolytes and repeat labs to be done after interventions completed. The patient's phosphorus results have been reviewed and are listed below.  Recent Labs   Lab 10/10/24  0458   PHOS 3.1

## 2024-10-10 NOTE — ANESTHESIA PREPROCEDURE EVALUATION
10/10/2024  Effie Tillman is a 39 y.o., female.  To undergo Procedure(s) (LRB):  ROBOTIC CHOLECYSTECTOMY (N/A)     Denies CP/SOB/GERD/MI/CVA/URI symptoms.  METS > 4  NPO > 8    Past Medical History:  Past Medical History:   Diagnosis Date    Electrical injuries Jan 19 2013    R arm.  Right ring finger    Ovarian cyst        Past Surgical History:  Past Surgical History:   Procedure Laterality Date    HYSTERECTOMY      TUBAL LIGATION      TUBAL LIGATION         Social History:  Social History     Socioeconomic History    Marital status:    Tobacco Use    Smoking status: Never    Smokeless tobacco: Never   Substance and Sexual Activity    Alcohol use: Yes     Comment: occassionally    Drug use: No    Sexual activity: Yes     Birth control/protection: Other-see comments     Comment: BTL     Social Drivers of Health     Financial Resource Strain: Low Risk  (10/10/2024)    Overall Financial Resource Strain (CARDIA)     Difficulty of Paying Living Expenses: Not hard at all   Food Insecurity: No Food Insecurity (10/10/2024)    Hunger Vital Sign     Worried About Running Out of Food in the Last Year: Never true     Ran Out of Food in the Last Year: Never true   Transportation Needs: No Transportation Needs (10/10/2024)    TRANSPORTATION NEEDS     Transportation : No   Stress: No Stress Concern Present (10/10/2024)    [employer] - Occupational Stress Questionnaire     Feeling of Stress : Not at all   Housing Stability: Low Risk  (10/10/2024)    Housing Stability Vital Sign     Unable to Pay for Housing in the Last Year: No     Homeless in the Last Year: No       Medications:  No current facility-administered medications on file prior to encounter.     Current Outpatient Medications on File Prior to Encounter   Medication Sig Dispense Refill    benzocaine-menthoL 6-10 mg lozenge Take 1 lozenge by mouth every 2 (two) hours as needed for Pain (sore throat). (Patient not taking:  Reported on 10/8/2024) 18 tablet 0    erythromycin (ROMYCIN) ophthalmic ointment Place a 1/2 inch ribbon of ointment into the lower eyelid 6 x daily. (Patient not taking: Reported on 10/8/2024) 1 Tube 0    etonogestrel-ethinyl estradiol (NUVARING) 0.12-0.015 mg/24 hr vaginal ring Place 1 each vaginally every 28 days. (Patient not taking: Reported on 10/8/2024) 1 each 11    famotidine (PEPCID) 20 MG tablet Take 1 tablet (20 mg total) by mouth 2 (two) times daily. (Patient not taking: Reported on 10/8/2024) 20 tablet 0    fluticasone propionate (FLONASE) 50 mcg/actuation nasal spray 1 spray (50 mcg total) by Each Nostril route 2 (two) times daily. (Patient not taking: Reported on 10/8/2024) 1 Bottle 0    hydrOXYzine HCL (ATARAX) 25 MG tablet Take 2 tablets (50 mg total) by mouth every 6 (six) hours. (Patient not taking: Reported on 10/8/2024) 30 tablet 0    ibuprofen (ADVIL,MOTRIN) 800 MG tablet Take 1 tablet (800 mg total) by mouth every 8 (eight) hours as needed for Pain. (Patient not taking: Reported on 10/8/2024) 30 tablet 0    ondansetron (ZOFRAN-ODT) 4 MG TbDL Take 1 tablet (4 mg total) by mouth every 6 (six) hours as needed (Nausea). (Patient not taking: Reported on 10/8/2024) 15 tablet 0    sucralfate (CARAFATE) 100 mg/mL suspension Take 10 mLs (1 g total) by mouth 4 (four) times daily as needed (heart burn). (Patient not taking: Reported on 10/8/2024) 414 mL 0       Allergies:  Review of patient's allergies indicates:  No Known Allergies    Active Problems:  Patient Active Problem List   Diagnosis    Uterine leiomyoma    Left ankle pain    Intractable nausea and vomiting    Epigastric pain    Hypokalemia    Sinus bradycardia    Elevated blood pressure reading without diagnosis of hypertension    Hypophosphatemia       Diagnostic Studies:   Latest Reference Range & Units 10/10/24 04:58   WBC 3.90 - 12.70 K/uL 9.31   RBC 4.00 - 5.40 M/uL 4.15   Hemoglobin 12.0 - 16.0 g/dL 11.2 (L)   Hematocrit 37.0 - 48.5 %  36.2 (L)   MCV 82 - 98 fL 87   MCH 27.0 - 31.0 pg 27.0   MCHC 32.0 - 36.0 g/dL 30.9 (L)   RDW 11.5 - 14.5 % 13.5   Platelet Count 150 - 450 K/uL 297   MPV 9.2 - 12.9 fL 9.7      Latest Reference Range & Units 10/10/24 04:58   Sodium 136 - 145 mmol/L 137   Potassium 3.5 - 5.1 mmol/L 3.2 (L)   Chloride 95 - 110 mmol/L 104   CO2 23 - 29 mmol/L 26   Anion Gap 8 - 16 mmol/L 7 (L)   BUN 6 - 20 mg/dL 7   Creatinine 0.5 - 1.4 mg/dL 0.9   eGFR >60 mL/min/1.73 m^2 >60   Glucose 70 - 110 mg/dL 96   Calcium 8.7 - 10.5 mg/dL 8.4 (L)   Phosphorus Level 2.7 - 4.5 mg/dL 3.1   Magnesium  1.6 - 2.6 mg/dL 1.9     EKG (10/8/24):  Sinus bradycardia   Nonspecific T wave abnormality   Prolonged QT     24 Hour Vitals:  Temp:  [36.3 °C (97.4 °F)-37.2 °C (98.9 °F)] 36.3 °C (97.4 °F)  Pulse:  [55-78] 62  Resp:  [17-20] 18  SpO2:  [95 %-99 %] 98 %  BP: (101-156)/(56-81) 109/56   See Nursing Charting For Additional Vitals      Pre-op Assessment    I have reviewed the Patient Summary Reports.     I have reviewed the Nursing Notes.       Review of Systems  Anesthesia Hx:  No problems with previous Anesthesia               Denies Personal Hx of Anesthesia complications.                    Social:  Non-Smoker, Social Alcohol Use       Hematology/Oncology:       -- Anemia:                                  Cardiovascular:  Cardiovascular Normal Exercise tolerance: good                 ECG has been reviewed.                          Pulmonary:  Pulmonary Normal                       Hepatic/GI:        Biliary colic          Neurological:  Neurology Normal                                      Endocrine:        Obesity / BMI > 30      Physical Exam  General: Well nourished and Cooperative    Airway:  Mallampati: III   Mouth Opening: Normal  TM Distance: Normal    Dental:  Intact, Partial Dentures    Chest/Lungs:  Clear to auscultation, Normal Respiratory Rate    Heart:  Rate: Normal  Rhythm: Regular Rhythm        Anesthesia Plan  Type of Anesthesia,  risks & benefits discussed:    Anesthesia Type: Gen ETT  Intra-op Monitoring Plan: Standard ASA Monitors  Post Op Pain Control Plan: multimodal analgesia and IV/PO Opioids PRN  Induction:  IV  Airway Plan: Direct and Video, Post-Induction  Informed Consent: Informed consent signed with the Patient and all parties understand the risks and agree with anesthesia plan.  All questions answered.   ASA Score: 2  Anesthesia Plan Notes:   GA with OETT  Standard ASA monitors  Recovery in PACU  PONV: 3    Ready For Surgery From Anesthesia Perspective.     .

## 2024-10-10 NOTE — ANESTHESIA POSTPROCEDURE EVALUATION
Anesthesia Post Evaluation    Patient: Effie Tillman    Procedure(s) Performed: Procedure(s) (LRB):  ROBOTIC CHOLECYSTECTOMY (N/A)    Final Anesthesia Type: general      Patient location during evaluation: PACU  Patient participation: Yes- Able to Participate  Level of consciousness: awake and alert, oriented and awake  Post-procedure vital signs: reviewed and stable  Airway patency: patent    PONV status at discharge: No PONV  Anesthetic complications: no      Cardiovascular status: blood pressure returned to baseline  Respiratory status: unassisted, spontaneous ventilation and room air  Hydration status: euvolemic  Follow-up not needed.              Vitals Value Taken Time   /59 10/10/24 1447   Temp 36.6 °C (97.8 °F) 10/10/24 1322   Pulse 55 10/10/24 1456   Resp 15 10/10/24 1456   SpO2 95 % 10/10/24 1456   Vitals shown include unfiled device data.      No case tracking events are documented in the log.      Pain/Milly Score: Pain Rating Prior to Med Admin: 6 (10/10/2024  2:10 PM)  Pain Rating Post Med Admin: 6 (10/10/2024  2:10 PM)  Milly Score: 9 (10/10/2024  2:45 PM)

## 2024-10-10 NOTE — ANESTHESIA PROCEDURE NOTES
Intubation    Date/Time: 10/10/2024 11:46 AM    Performed by: Kelin Schafer CRNA  Authorized by: Sean Hartman MD    Intubation:     Induction:  Intravenous    Intubated:  Postinduction    Mask Ventilation:  Easy mask    Attempts:  1    Attempted By:  Student    Method of Intubation:  Video laryngoscopy    Blade:  [place] 3    Laryngeal View Grade: Grade I - full view of cords      Difficult Airway Encountered?: No      Complications:  None    Airway Device:  Oral endotracheal tube    Airway Device Size:  7.0    Style/Cuff Inflation:  Cuffed (inflated to minimal occlusive pressure)    Inflation Amount (mL):  6    Tube secured:  21    Secured at:  The lips    Placement Verified By:  Capnometry    Complicating Factors:  None    Findings Post-Intubation:  BS equal bilateral and atraumatic/condition of teeth unchanged

## 2024-10-10 NOTE — CONSULTS
Consult Note    SUBJECTIVE:     History of Present Illness:  Patient is a 39 y.o. female presents with sudden onset severe upper abdominal pain that began after eating pizza on 10/7. She reports associated chills and vomiting. Patient initially believed she was having indigestion but got no relief from Mylanta. She reports she has never had an episode like this before. No significant PMH, no daily meds, PSH laparoscopic hysterectomy/BSO.    Chief Complaint   Patient presents with    Abdominal Pain     Pt c/o pain above her umbilical area that began last night. Pt stated she can feel a knot in her abdomen, and has nausea and vomiting.       Review of patient's allergies indicates:  No Known Allergies    Current Facility-Administered Medications   Medication Dose Route Frequency Provider Last Rate Last Admin    acetaminophen tablet 650 mg  650 mg Oral Q6H PRN Yuki Manicia S., NP   650 mg at 10/09/24 1107    dextrose 10% bolus 125 mL 125 mL  12.5 g Intravenous PRN Yuki, Manicia S., NP        dextrose 10% bolus 250 mL 250 mL  25 g Intravenous PRN Yuki Manicia S., NP        docusate sodium capsule 100 mg  100 mg Oral BID Ginger Delaney NP   100 mg at 10/09/24 0850    enoxaparin injection 40 mg  40 mg Subcutaneous Q24H (prophylaxis, 1700) YukiBharathiicia S., NP   40 mg at 10/09/24 1710    glucagon (human recombinant) injection 1 mg  1 mg Intramuscular PRN Yuki, Manicia S., NP        glucose chewable tablet 16 g  16 g Oral PRN Yuki, Manicia S., NP        glucose chewable tablet 24 g  24 g Oral PRN Yuki, Manicia S., NP        HYDROmorphone injection 0.5 mg  0.5 mg Intravenous Q4H PRN Yuki Manicia S., NP        naloxone 0.4 mg/mL injection 0.02 mg  0.02 mg Intravenous PRN Yuki Manicia S., NP        oxyCODONE immediate release tablet 5 mg  5 mg Oral Q4H PRN Guero Hernandez MD   5 mg at 10/09/24 1711    pantoprazole injection 40 mg  40 mg Intravenous BID Ginger Delaney NP   40 mg at 10/09/24 0850     prochlorperazine injection Soln 5 mg  5 mg Intravenous Q6H PRN Eleni Mirza, NP        sodium chloride 0.9% flush 10 mL  10 mL Intravenous Q12H PRN Eleni Mirza NP         Current Outpatient Medications   Medication Sig Dispense Refill    benzocaine-menthoL 6-10 mg lozenge Take 1 lozenge by mouth every 2 (two) hours as needed for Pain (sore throat). (Patient not taking: Reported on 10/8/2024) 18 tablet 0    erythromycin (ROMYCIN) ophthalmic ointment Place a 1/2 inch ribbon of ointment into the lower eyelid 6 x daily. (Patient not taking: Reported on 10/8/2024) 1 Tube 0    etonogestrel-ethinyl estradiol (NUVARING) 0.12-0.015 mg/24 hr vaginal ring Place 1 each vaginally every 28 days. (Patient not taking: Reported on 10/8/2024) 1 each 11    famotidine (PEPCID) 20 MG tablet Take 1 tablet (20 mg total) by mouth 2 (two) times daily. (Patient not taking: Reported on 10/8/2024) 20 tablet 0    fluticasone propionate (FLONASE) 50 mcg/actuation nasal spray 1 spray (50 mcg total) by Each Nostril route 2 (two) times daily. (Patient not taking: Reported on 10/8/2024) 1 Bottle 0    hydrOXYzine HCL (ATARAX) 25 MG tablet Take 2 tablets (50 mg total) by mouth every 6 (six) hours. (Patient not taking: Reported on 10/8/2024) 30 tablet 0    ibuprofen (ADVIL,MOTRIN) 800 MG tablet Take 1 tablet (800 mg total) by mouth every 8 (eight) hours as needed for Pain. (Patient not taking: Reported on 10/8/2024) 30 tablet 0    ondansetron (ZOFRAN-ODT) 4 MG TbDL Take 1 tablet (4 mg total) by mouth every 6 (six) hours as needed (Nausea). (Patient not taking: Reported on 10/8/2024) 15 tablet 0    sucralfate (CARAFATE) 100 mg/mL suspension Take 10 mLs (1 g total) by mouth 4 (four) times daily as needed (heart burn). (Patient not taking: Reported on 10/8/2024) 414 mL 0       Past Medical History:   Diagnosis Date    Electrical injuries Jan 19 2013    R arm.  Right ring finger    Ovarian cyst      Past Surgical History:   Procedure  "Laterality Date    HYSTERECTOMY      TUBAL LIGATION      TUBAL LIGATION       Family History   Problem Relation Name Age of Onset    Breast cancer Neg Hx      Colon cancer Neg Hx      Ovarian cancer Neg Hx       Social History     Tobacco Use    Smoking status: Never    Smokeless tobacco: Never   Substance Use Topics    Alcohol use: Yes     Comment: occassionally    Drug use: No        Review of Systems:  Review of Systems   Constitutional:  Positive for appetite change and chills.   HENT:  Negative for congestion.    Respiratory:  Negative for shortness of breath.    Cardiovascular:  Negative for chest pain.   Gastrointestinal:  Positive for abdominal pain (upper abdomen, severe, constant), nausea and vomiting.         OBJECTIVE:     Vital Signs (Most Recent)  Temp: 98.7 °F (37.1 °C) (10/09/24 1930)  Pulse: (!) 58 (10/09/24 1930)  Resp: 18 (10/09/24 1711)  BP: (!) 156/70 (10/09/24 1930)  SpO2: 99 % (10/09/24 1930)  5' 5" (1.651 m)  96.2 kg (212 lb)     Physical Exam:  Physical Exam  Constitutional:       General: She is not in acute distress.     Appearance: Normal appearance. She is obese. She is not ill-appearing.   HENT:      Head: Normocephalic and atraumatic.      Nose: Nose normal.      Mouth/Throat:      Mouth: Mucous membranes are moist.   Eyes:      Extraocular Movements: Extraocular movements intact.   Cardiovascular:      Rate and Rhythm: Normal rate.      Pulses: Normal pulses.   Pulmonary:      Effort: Pulmonary effort is normal.   Abdominal:      General: There is no distension.      Palpations: Abdomen is soft.      Tenderness: There is abdominal tenderness (RUQ, midepigastirum). There is no guarding.   Skin:     General: Skin is warm and dry.   Neurological:      General: No focal deficit present.      Mental Status: She is alert.           Laboratory  CBC  10.12 12.1 309    37.5     Coag                 BMP  138 106 6 111   3.6 24 0.9     CaMgPhos  8.6   1.8   3.4      Last labs from current " encounter as of 10/09/2024-9:11 PM      Diagnostic Results:  CT AP - cholelithiasis, bilateral fat-containing inguinal hernias  US RUQ - final read pending, sludge and wall thickening      ASSESSMENT/PLAN:     Ms. Tillman is a 38yo female who presents with sudden onset abdominal pain, nausea, vomiting, and chills. Presentation and imaging findings suspicious for symptomatic cholelithiasis. LFTs and WBC wnl. Patient expressed interest in cholecystectomy sooner rather than later.    PLAN:Plan     - Follow up RUQ US read  - Laparoscopic cholecystectomy tomorrow vs outpatient  - NPO midnight  - Will get informed consent tomorrow  - Surgery will continue to follow  - Remainder of care per primary team    Bruce Diamond MD  [facility] General Surgery PGY-II  10/09/2024 9:16 PM

## 2024-10-11 ENCOUNTER — PATIENT MESSAGE (OUTPATIENT)
Dept: SURGERY | Facility: CLINIC | Age: 39
End: 2024-10-11
Payer: MEDICAID

## 2024-10-11 ENCOUNTER — NURSE TRIAGE (OUTPATIENT)
Dept: ADMINISTRATIVE | Facility: CLINIC | Age: 39
End: 2024-10-11
Payer: MEDICAID

## 2024-10-11 VITALS
SYSTOLIC BLOOD PRESSURE: 108 MMHG | HEART RATE: 69 BPM | DIASTOLIC BLOOD PRESSURE: 60 MMHG | RESPIRATION RATE: 18 BRPM | BODY MASS INDEX: 36.8 KG/M2 | OXYGEN SATURATION: 100 % | WEIGHT: 220.88 LBS | TEMPERATURE: 98 F | HEIGHT: 65 IN

## 2024-10-11 LAB
ANION GAP SERPL CALC-SCNC: 10 MMOL/L (ref 8–16)
BACTERIA STL CULT: NORMAL
BUN SERPL-MCNC: 9 MG/DL (ref 6–20)
CALCIUM SERPL-MCNC: 8.7 MG/DL (ref 8.7–10.5)
CHLORIDE SERPL-SCNC: 102 MMOL/L (ref 95–110)
CO2 SERPL-SCNC: 25 MMOL/L (ref 23–29)
CREAT SERPL-MCNC: 0.9 MG/DL (ref 0.5–1.4)
ERYTHROCYTE [DISTWIDTH] IN BLOOD BY AUTOMATED COUNT: 13.4 % (ref 11.5–14.5)
EST. GFR  (NO RACE VARIABLE): >60 ML/MIN/1.73 M^2
GLUCOSE SERPL-MCNC: 106 MG/DL (ref 70–110)
HCT VFR BLD AUTO: 35 % (ref 37–48.5)
HGB BLD-MCNC: 11 G/DL (ref 12–16)
MAGNESIUM SERPL-MCNC: 2.1 MG/DL (ref 1.6–2.6)
MCH RBC QN AUTO: 27.4 PG (ref 27–31)
MCHC RBC AUTO-ENTMCNC: 31.4 G/DL (ref 32–36)
MCV RBC AUTO: 87 FL (ref 82–98)
PHOSPHATE SERPL-MCNC: 2.6 MG/DL (ref 2.7–4.5)
PLATELET # BLD AUTO: 322 K/UL (ref 150–450)
PMV BLD AUTO: 9.4 FL (ref 9.2–12.9)
POTASSIUM SERPL-SCNC: 3.7 MMOL/L (ref 3.5–5.1)
RBC # BLD AUTO: 4.02 M/UL (ref 4–5.4)
SODIUM SERPL-SCNC: 137 MMOL/L (ref 136–145)
WBC # BLD AUTO: 11.21 K/UL (ref 3.9–12.7)

## 2024-10-11 PROCEDURE — G0378 HOSPITAL OBSERVATION PER HR: HCPCS

## 2024-10-11 PROCEDURE — 85027 COMPLETE CBC AUTOMATED: CPT | Performed by: NURSE PRACTITIONER

## 2024-10-11 PROCEDURE — 25000003 PHARM REV CODE 250: Performed by: NURSE PRACTITIONER

## 2024-10-11 PROCEDURE — 84100 ASSAY OF PHOSPHORUS: CPT | Performed by: NURSE PRACTITIONER

## 2024-10-11 PROCEDURE — 25000003 PHARM REV CODE 250

## 2024-10-11 PROCEDURE — 83735 ASSAY OF MAGNESIUM: CPT | Performed by: NURSE PRACTITIONER

## 2024-10-11 PROCEDURE — 36415 COLL VENOUS BLD VENIPUNCTURE: CPT | Performed by: NURSE PRACTITIONER

## 2024-10-11 PROCEDURE — 80048 BASIC METABOLIC PNL TOTAL CA: CPT | Performed by: NURSE PRACTITIONER

## 2024-10-11 RX ORDER — PANTOPRAZOLE SODIUM 40 MG/1
40 TABLET, DELAYED RELEASE ORAL DAILY
Qty: 90 TABLET | Refills: 3 | Status: SHIPPED | OUTPATIENT
Start: 2024-10-11 | End: 2025-10-11

## 2024-10-11 RX ORDER — SODIUM,POTASSIUM PHOSPHATES 280-250MG
2 POWDER IN PACKET (EA) ORAL ONCE
Status: COMPLETED | OUTPATIENT
Start: 2024-10-11 | End: 2024-10-11

## 2024-10-11 RX ORDER — OXYCODONE HYDROCHLORIDE 5 MG/1
5 TABLET ORAL EVERY 4 HOURS PRN
Qty: 14 TABLET | Refills: 0 | Status: SHIPPED | OUTPATIENT
Start: 2024-10-11

## 2024-10-11 RX ORDER — DOCUSATE SODIUM 100 MG/1
100 CAPSULE, LIQUID FILLED ORAL 2 TIMES DAILY
Qty: 60 CAPSULE | Refills: 0 | Status: SHIPPED | OUTPATIENT
Start: 2024-10-11 | End: 2024-12-10

## 2024-10-11 RX ADMIN — Medication 2 PACKET: at 09:10

## 2024-10-11 RX ADMIN — OXYCODONE HYDROCHLORIDE 5 MG: 5 TABLET ORAL at 12:10

## 2024-10-11 RX ADMIN — DOCUSATE SODIUM 100 MG: 100 CAPSULE, LIQUID FILLED ORAL at 09:10

## 2024-10-11 RX ADMIN — OXYCODONE HYDROCHLORIDE 5 MG: 5 TABLET ORAL at 08:10

## 2024-10-11 NOTE — NURSING
Pt laying supine, head tilted to the left, eyes closed. Easily aroused. Family at bedside. 3 Dressing patches on lower abdomen. Red drainage visible under lateral dressings. Pt states pain is felt only when moving. Ambulated to bathroom, Assist x 1, urine occurrence, clear dark yellow with no odor. Pt tolerated ambulation well. A&O x4. Medication review, meds tolerated well. IV clean dry and intact. Bed in lowest position, personal items and call light within reach, instructed to call for help if needed.    [facility] [place]  Nurses Note -- 4 Eyes        10/10/2024         Skin assessed on: Q Shift        [x] No Pressure Injuries Present                 [x]Prevention Measures Documented     [] Yes LDA  for Pressure Injury Previously documented      [] Yes New Pressure Injury Discovered              [] LDA for New Pressure Injury Added        Attending RN:  Pratik Escobar LPN      Second RN:  MATTY Fregoso

## 2024-10-11 NOTE — PLAN OF CARE
Problem: Adult Inpatient Plan of Care  Goal: Plan of Care Review  Outcome: Met  Goal: Patient-Specific Goal (Individualized)  Outcome: Met  Goal: Absence of Hospital-Acquired Illness or Injury  Outcome: Met  Goal: Optimal Comfort and Wellbeing  Outcome: Met  Goal: Readiness for Transition of Care  Outcome: Met     Problem: Pain Acute  Goal: Optimal Pain Control and Function  Outcome: Met     Problem: Nausea and Vomiting  Goal: Nausea and Vomiting Relief  Outcome: Met     Problem: Wound  Goal: Optimal Coping  Outcome: Met  Goal: Optimal Functional Ability  Outcome: Met  Goal: Absence of Infection Signs and Symptoms  Outcome: Met  Goal: Improved Oral Intake  Outcome: Met  Goal: Optimal Pain Control and Function  Outcome: Met  Goal: Skin Health and Integrity  Outcome: Met  Goal: Optimal Wound Healing  Outcome: Met     Problem: Fall Injury Risk  Goal: Absence of Fall and Fall-Related Injury  Outcome: Met

## 2024-10-11 NOTE — PLAN OF CARE
Problem: Adult Inpatient Plan of Care  Goal: Plan of Care Review  Outcome: Progressing  Goal: Patient-Specific Goal (Individualized)  Outcome: Progressing  Goal: Absence of Hospital-Acquired Illness or Injury  Outcome: Progressing  Goal: Optimal Comfort and Wellbeing  Outcome: Progressing  Goal: Readiness for Transition of Care  Outcome: Progressing     Problem: Pain Acute  Goal: Optimal Pain Control and Function  Outcome: Progressing     Problem: Nausea and Vomiting  Goal: Nausea and Vomiting Relief  Outcome: Progressing     Problem: Wound  Goal: Optimal Coping  Outcome: Progressing  Goal: Optimal Functional Ability  Outcome: Progressing  Goal: Absence of Infection Signs and Symptoms  Outcome: Progressing  Goal: Improved Oral Intake  Outcome: Progressing  Goal: Optimal Pain Control and Function  Outcome: Progressing  Goal: Skin Health and Integrity  Outcome: Progressing  Goal: Optimal Wound Healing  Outcome: Progressing     Problem: Fall Injury Risk  Goal: Absence of Fall and Fall-Related Injury  Outcome: Progressing

## 2024-10-11 NOTE — NURSING
[facility]  Nurses Note -- 4 Eyes      10/11/2024       Skin assessed on: Q Shift      [x] No Pressure Injuries Present    []Prevention Measures Documented    [] Yes LDA  for Pressure Injury Previously documented     [] Yes New Pressure Injury Discovered   [] LDA for New Pressure Injury Added      Attending RN:  Danelle Lundy LPN     Second RN:  Pratik CASTILLO

## 2024-10-11 NOTE — DISCHARGE INSTRUCTIONS
Our goal at [facility] is to always give you outstanding care and exceptional service. You may receive a survey by mail, text or e-mail in the next 7-10 days from Mckayla Vazquez and our leadership team asking about the care you received with us. The survey should only take 5-10 minutes to complete and is very important to us.     Your feedback provides us with a way to recognize our staff who work tirelessly to provide the best care! Also, your responses help us learn how to improve when your experience was below our aspiration of excellence. We WILL use your feedback to continue making improvements to help us provide the highest quality care. We keep your personal information and feedback confidential. We appreciate your time completing this survey and can't wait to hear from you!!!     We want you to leave us today feeling like you can DEFINITELY recommend us to others! We look forward to your continued care with us! Thanks so much for choosing [facility] for your healthcare needs!

## 2024-10-11 NOTE — HOSPITAL COURSE
Admitted for epigastric pain.     n the ED, Labs K 3.4, otherwise unremarkable  CT abdomen and pelvis: Subcentimeter hypodensity within the lateral aspect of the posterior right lobe of the liver, too small to adequately characterize. Cholelithiasis.Subcentimeter hypodensity originating from the lower pole of the left kidney, likely a small cyst.Status post hysterectomy. 2.9 cm right adnexal cyst, likely a dominant follicle.Bilateral fat containing inguinal hernias.  CXR: showed Interstitial findings are accentuated by habitus and shallow inspiratory effort, no large focal consolidation.   EKG: with sinus bradycardia with ventricular rate of 57. MI interval 186. QRS 88.  milliseconds.      Consulted GI: Agree with supportive care and antiemetics. Rec po ppi bid.  Rec stool softeners and collect stool culture d/t suspicion of food poisoning.   EGD:   - Esophagogastric landmarks identified.   - 2 cm hiatal hernia.   - Non-obstructing Schatzki ring.   - Erythematous mucosa in the prepyloric region of the stomach. Biopsied.  - Normal duodenal bulb, first portion of the duodenum and second portion of the duodenum.   Recommendation: - Await pathology results.  - Consider a general surgery consult for possible biliary colic.    Consulted general surgery: status post laparoscopic cholecystectomy on 10/10/24. OK for discharge. follow up Dr. Phipps in clinic in 1-2 weeks    Bradycardia   Tele monitoring  EKG as needed  Added TSH 0.807  T4 1.00 and T3 pending  Referral to sleep study outpatient     Replaced electrolytes as needed. BP improved with Pain medication. Follow up outpatient with PCP

## 2024-10-11 NOTE — DISCHARGE SUMMARY
"[facility] Medicine  Discharge Summary      Patient Name: Effie Tillman  MRN: [id number]  MATEUS: [id number]  Patient Class: OP- Observation  Admission Date: 10/8/2024  Hospital Length of Stay: 0 days  Discharge Date and Time:  10/11/2024 11:24 AM  Attending Physician: Hunter Tinoco MD   Discharging Provider: Eleni Mirza NP  Primary Care Provider: Jennifer Primary Doctor    Primary Care Team:  Hosp Med TEGAN 3    HPI:   39 year old female with past medical history of ovarian cyst present to the ED with complain of epigastric and right upper quadrant abdominal pain with associated nausea and vomiting that began last night. Patient reports it feels like she has "a knot in her abdomen". Reports normal bowel movements but notes she has not passed flatus since yesterday. Denies history of gastric ulcers or gastritis. Patient states she has had "too many" emesis episodes to count. Patient further endorses chest pain with associated dyspnea while in ED. Denies any past cardiac history. Reports history of heart burn but states current episode does not feel similar. No other aggravating/alleviating factors. Denies dysuria, urinary frequency, diarrhea, or other associated symptoms. Patient notes occasionally EtOH use. States last EtOH usage was 3 days ago. Patient denies illicit drug use.    In the ED, Labs K 3.4, otherwise unremarkable  CT abdomen and pelvis: Subcentimeter hypodensity within the lateral aspect of the posterior right lobe of the liver, too small to adequately characterize. Cholelithiasis.Subcentimeter hypodensity originating from the lower pole of the left kidney, likely a small cyst.Status post hysterectomy. 2.9 cm right adnexal cyst, likely a dominant follicle.Bilateral fat containing inguinal hernias.  CXR: showed Interstitial findings are accentuated by habitus and shallow inspiratory effort, no large focal consolidation.   EKG: with sinus bradycardia with ventricular rate of 57. CT " interval 186. QRS 88.  milliseconds.       Procedure(s) (LRB):  ROBOTIC CHOLECYSTECTOMY (N/A)      Hospital Course:   Admitted for epigastric pain.     n the ED, Labs K 3.4, otherwise unremarkable  CT abdomen and pelvis: Subcentimeter hypodensity within the lateral aspect of the posterior right lobe of the liver, too small to adequately characterize. Cholelithiasis.Subcentimeter hypodensity originating from the lower pole of the left kidney, likely a small cyst.Status post hysterectomy. 2.9 cm right adnexal cyst, likely a dominant follicle.Bilateral fat containing inguinal hernias.  CXR: showed Interstitial findings are accentuated by habitus and shallow inspiratory effort, no large focal consolidation.   EKG: with sinus bradycardia with ventricular rate of 57. KS interval 186. QRS 88.  milliseconds.      Consulted GI: Agree with supportive care and antiemetics. Rec po ppi bid.  Rec stool softeners and collect stool culture d/t suspicion of food poisoning.   EGD:   - Esophagogastric landmarks identified.   - 2 cm hiatal hernia.   - Non-obstructing Schatzki ring.   - Erythematous mucosa in the prepyloric region of the stomach. Biopsied.  - Normal duodenal bulb, first portion of the duodenum and second portion of the duodenum.   Recommendation: - Await pathology results.  - Consider a general surgery consult for possible biliary colic.    Consulted general surgery: status post laparoscopic cholecystectomy on 10/10/24. OK for discharge. follow up Dr. Phipps in clinic in 1-2 weeks    Bradycardia   Tele monitoring  EKG as needed  Added TSH 0.807  T4 1.00 and T3 pending  Referral to sleep study outpatient     Replaced electrolytes as needed. BP improved with Pain medication. Follow up outpatient with PCP       Goals of Care Treatment Preferences:  Code Status: Full Code      SDOH Screening:  The patient was screened for utility difficulties, food insecurity, transport difficulties, housing insecurity, and  interpersonal safety and there were no concerns identified this admission.     Consults:   Consults (From admission, onward)          Status Ordering Provider     Inpatient consult to General Surgery  Once        Provider:  Bruce Diamond MD    Completed KIP HENDERSON     Inpatient consult to Gastroenterology  Once        Provider:  Ginger Delaney NP    Completed KIP HENDERSON            No new Assessment & Plan notes have been filed under this hospital service since the last note was generated.  Service: Hospital Medicine    Final Active Diagnoses:    Diagnosis Date Noted POA    PRINCIPAL PROBLEM:  Epigastric pain [R10.13] 10/08/2024 Unknown    Hypophosphatemia [E83.39] 10/09/2024 Unknown    Intractable nausea and vomiting [R11.2] 10/08/2024 Unknown    Hypokalemia [E87.6] 10/08/2024 Unknown    Sinus bradycardia [R00.1] 10/08/2024 Unknown    Elevated blood pressure reading without diagnosis of hypertension [R03.0] 10/08/2024 Unknown      Problems Resolved During this Admission:       Discharged Condition: stable    Disposition: Home or Self Care    Follow Up:   Follow-up Information       [facility]. Go on 10/16/2024.    Why: Dr. Richardson is no longer at [facility].  You will be seen by LEÓN Cano on Wednesday, October 16, 2024 at 11:00 a.m.  Contact information:  [address]  [place]  [address] [phone]                         Patient Instructions:      Ambulatory referral/consult to General Surgery   Standing Status: Future   Referral Priority: Routine Referral Type: Consultation   Referral Reason: Specialty Services Required   Referred to Provider: GENOVEVA ROLLINS Requested Specialty: General Surgery   Number of Visits Requested: 1     Ambulatory referral/consult to Sleep Disorders   Standing Status: Future   Referral Priority: Routine Referral Type: Consultation   Requested Specialty: Sleep Medicine   Number of Visits Requested: 1     Ambulatory  "referral/consult to Gastroenterology   Standing Status: Future   Referral Priority: Routine Referral Type: Consultation   Referral Reason: Specialty Services Required   Referred to Provider: BRUCE NOLAND Requested Specialty: Gastroenterology   Number of Visits Requested: 1       Significant Diagnostic Studies: Labs: BMP:   Recent Labs   Lab 10/09/24  1505 10/10/24  0458 10/11/24  0452   GLU  --  96 106   NA  --  137 137   K 3.6 3.2* 3.7   CL  --  104 102   CO2  --  26 25   BUN  --  7 9   CREATININE  --  0.9 0.9   CALCIUM  --  8.4* 8.7   MG  --  1.9 2.1   , CMP   Recent Labs   Lab 10/09/24  1505 10/10/24  0458 10/11/24  0452   NA  --  137 137   K 3.6 3.2* 3.7   CL  --  104 102   CO2  --  26 25   GLU  --  96 106   BUN  --  7 9   CREATININE  --  0.9 0.9   CALCIUM  --  8.4* 8.7   ANIONGAP  --  7* 10   , CBC   Recent Labs   Lab 10/10/24  0458 10/11/24  0452   WBC 9.31 11.21   HGB 11.2* 11.0*   HCT 36.2* 35.0*    322   , INR No results found for: "INR", "PROTIME", Lipid Panel No results found for: "CHOL", "HDL", "LDLCALC", "TRIG", "CHOLHDL", Troponin   Recent Labs   Lab 10/08/24  0934   TROPONINI 0.006   , A1C: No results for input(s): "HGBA1C" in the last 4320 hours., and All labs within the past 24 hours have been reviewed    Pending Diagnostic Studies:       Procedure Component Value Units Date/Time    Specimen to Pathology, Surgery Gastrointestinal tract [[id number]] Collected: 10/08/24 1459    Order Status: Sent Lab Status: In process Updated: 10/09/24 1220    Specimen: Tissue     Specimen to Pathology, Surgery General Surgery [[id number]] Collected: 10/10/24 1245    Order Status: Sent Lab Status: In process Updated: 10/11/24 0726    Specimen: Tissue            Medications:  Reconciled Home Medications:      Medication List        START taking these medications      docusate sodium 100 MG capsule  Commonly known as: COLACE  Take 1 capsule (100 mg total) by mouth 2 (two) times daily.     oxyCODONE 5 MG " immediate release tablet  Commonly known as: ROXICODONE  Take 1 tablet (5 mg total) by mouth every 4 (four) hours as needed for Pain.     pantoprazole 40 MG tablet  Commonly known as: PROTONIX  Take 1 tablet (40 mg total) by mouth once daily.            STOP taking these medications      benzocaine-menthoL 6-10 mg lozenge     erythromycin ophthalmic ointment  Commonly known as: ROMYCIN     etonogestreL-ethinyl estradioL 0.12-0.015 mg/24 hr vaginal ring  Commonly known as: NUVARING     famotidine 20 MG tablet  Commonly known as: PEPCID     fluticasone propionate 50 mcg/actuation nasal spray  Commonly known as: FLONASE     hydrOXYzine HCL 25 MG tablet  Commonly known as: ATARAX     ibuprofen 800 MG tablet  Commonly known as: ADVIL,MOTRIN     ondansetron 4 MG Tbdl  Commonly known as: ZOFRAN-ODT     sucralfate 100 mg/mL suspension  Commonly known as: CARAFATE              Indwelling Lines/Drains at time of discharge:   Lines/Drains/Airways       None                   Time spent on the discharge of patient: 30 minutes         Eleni Mirza NP  Department of Hospital Medicine  [facility]

## 2024-10-11 NOTE — PROGRESS NOTES
"39 year old woman POD1 from laparoscopic cholecystectomy  Tolerating diet, passing flatus, pain well controlled    /68 (BP Location: Left arm, Patient Position: Lying)   Pulse (!) 52   Temp 98.4 °F (36.9 °C) (Oral)   Resp 18   Ht 5' 5" (1.651 m)   Wt 100.2 kg (220 lb 14.4 oz)   LMP 07/10/2019   SpO2 100%   Breastfeeding No   BMI 36.76 kg/m²     No acute distress  Abdomen soft, nondistended, appropriately tender to palpation  Outer dressings removed, steri strips with minimal serosanguineous staining    Component      Latest Ref Rng 10/11/2024   WBC      3.90 - 12.70 K/uL 11.21    RBC      4.00 - 5.40 M/uL 4.02    Hemoglobin      12.0 - 16.0 g/dL 11.0 (L)    Hematocrit      37.0 - 48.5 % 35.0 (L)    MCV      82 - 98 fL 87    MCH      27.0 - 31.0 pg 27.4    MCHC      32.0 - 36.0 g/dL 31.4 (L)    RDW      11.5 - 14.5 % 13.4    Platelet Count      150 - 450 K/uL 322    MPV      9.2 - 12.9 fL 9.4       Legend:  (L) Low    39 year old woman POD1 from laparoscopic cholecystectomy  -OK for discharge   -follow up Dr. Phipps in clinic in 1-2 weeks    Sariah Coles MD   General Surgery  [facility]    "

## 2024-10-11 NOTE — PLAN OF CARE
Case Management Final Discharge Note      Discharge Disposition: Home    New DME ordered / company name: none    Relevant SDOH / Transition of Care Barriers:  none    Primary Caretaker and contact information: Independent    Scheduled followup appointment: General surgery- 10/22/24 and PCP- 10/16/24    Referrals placed: GI, gen surgery and sleep disorders- clinics to contact patient to schedule.     Transportation: Private vehicle.     Patient and family educated on discharge services and updated on DC plan. Bedside RN notified, patient clear to discharge from Case Management Perspective.      10/11/24 1123   Final Note   Assessment Type Final Discharge Note   Anticipated Discharge Disposition Home   Hospital Resources/Appts/Education Provided Provided patient/caregiver with written discharge plan information;Appointments scheduled and added to AVS   Post-Acute Status   Discharge Delays None known at this time

## 2024-10-11 NOTE — TELEPHONE ENCOUNTER
Pt calling with c/o not having rx filled and looked in the chart and it appears that it was a written prescription. I spoke to the hospitalist and she contacted the nurses station and they found the RX. Pts daughter will go an pick it up. Pt told this information and to call back if any other questions or concerns           Reason for Disposition   Prescription not at pharmacy and was prescribed by PCP recently  (Exception: triager has access to EMR and prescription is recorded there. Go to Home Care and confirm for pharmacy.)    Additional Information   Negative: Intentional drug overdose and suicidal thoughts or ideas   Negative: MORE THAN A DOUBLE DOSE of a prescription or over-the-counter (OTC) drug   Negative: DOUBLE DOSE (an extra dose or lesser amount) of prescription drug and any symptoms (e.g., dizziness, nausea, pain, sleepiness)   Negative: DOUBLE DOSE (an extra dose or lesser amount) of over-the-counter (OTC) drug and any symptoms (e.g., dizziness, nausea, pain, sleepiness)   Negative: Took another person's prescription drug   Negative: DOUBLE DOSE (an extra dose or lesser amount) of prescription drug and NO symptoms  (Exception: A double dose of antibiotics.)   Negative: Diabetes drug error or overdose (e.g., took wrong type of insulin or took extra dose)   Negative: Caller has medication question about med NOT prescribed by PCP and triager unable to answer question (e.g., compatibility with other med, storage)    Protocols used: Medication Question Call-A-[facility]

## 2024-10-14 LAB
FINAL PATHOLOGIC DIAGNOSIS: NORMAL
FINAL PATHOLOGIC DIAGNOSIS: NORMAL
GROSS: NORMAL
GROSS: NORMAL
Lab: NORMAL
Lab: NORMAL

## 2024-10-15 ENCOUNTER — TELEPHONE (OUTPATIENT)
Dept: SURGERY | Facility: CLINIC | Age: 39
End: 2024-10-15
Payer: MEDICAID

## 2024-10-15 NOTE — TELEPHONE ENCOUNTER
Pt advised that I spoke with  and he will send her in a refill. I also advised her to take tylenol in between each pain medication. Pt verbalized understanding.         ----- Message from Higinio sent at 10/15/2024  2:00 PM CDT -----  Type:  RX Refill Request    Who Called: pt  Refill or New Rx:refill  RX Name and Strength:oxyCODONE (ROXICODONE) 5 MG immediate release tablet  How is the patient currently taking it? (ex. 1XDay): Route: Take 1 tablet (5 mg total) by mouth every 4 (four) hours as needed for Pain. - Oral  Is this a 30 day or 90 day RX:14  Preferred Pharmacy with phone number:[id number] - [place], [place] - [address] AT [facility] [address]   Phone: [phone]  Fax: [phone]    Local or Mail Order:local  Ordering Provider:[facility]  Would the patient rather a call back or a response via "[employer]?   Best Call Back Number:  Additional Information: pt is currently out of medication and is in pain

## 2024-10-16 RX ORDER — OXYCODONE AND ACETAMINOPHEN 5; 325 MG/1; MG/1
1 TABLET ORAL EVERY 4 HOURS PRN
Qty: 15 TABLET | Refills: 0 | Status: SHIPPED | OUTPATIENT
Start: 2024-10-16 | End: 2024-11-15

## 2024-10-16 RX ORDER — OXYCODONE AND ACETAMINOPHEN 5; 325 MG/1; MG/1
1 TABLET ORAL EVERY 4 HOURS PRN
Qty: 15 TABLET | Refills: 0 | Status: SHIPPED | OUTPATIENT
Start: 2024-10-16 | End: 2024-10-16

## 2024-10-17 ENCOUNTER — TELEPHONE (OUTPATIENT)
Dept: SURGERY | Facility: CLINIC | Age: 39
End: 2024-10-17
Payer: MEDICAID

## 2024-10-17 NOTE — TELEPHONE ENCOUNTER
Pt advised that I spoke with her pharmacy and that her medication was received via e scribe I also, was advised that she needs to become a permanent patient before they can disburse any med's and that someone form [facility] will be contacting her to discuss further. Pt verbalized understanding.       ----- Message from Miguelangel sent at 10/17/2024  9:45 AM CDT -----  Regarding: Effie  Type: RX Refill Request     Who Called:Effie      Have you contacted your pharmacy: Yes     Refill or New Rx: Refill      RX Name and Strength: oxyCODONE-acetaminophen (PERCOCET) 5-325 mg per tablet//Patient stated that the pharmacy advised that they did not received the prescription. Please reach out to the pharmacy and call the patient once done.     Preferred Pharmacy with phone number:.  [id number] - [place], [place] - [address] AT [facility] [address]  [address] [place] [address]  Phone: [phone] Fax: [phone]    Local or Mail Order: Local     Ordering Provider: Dr. Vargas Phipps     Would the patient rather a call back or a response via [facility]? Callback      Best Call Back Number: .[phone]      Additional Information:

## 2024-10-22 ENCOUNTER — OFFICE VISIT (OUTPATIENT)
Dept: SURGERY | Facility: CLINIC | Age: 39
End: 2024-10-22
Payer: MEDICAID

## 2024-10-22 VITALS
SYSTOLIC BLOOD PRESSURE: 120 MMHG | BODY MASS INDEX: 36.8 KG/M2 | HEART RATE: 75 BPM | WEIGHT: 220.88 LBS | DIASTOLIC BLOOD PRESSURE: 80 MMHG | HEIGHT: 65 IN

## 2024-10-22 DIAGNOSIS — K81.9 CHOLECYSTITIS: Primary | ICD-10-CM

## 2024-10-22 PROCEDURE — 3079F DIAST BP 80-89 MM HG: CPT | Mod: CPTII,,, | Performed by: SURGERY

## 2024-10-22 PROCEDURE — 99024 POSTOP FOLLOW-UP VISIT: CPT | Mod: ,,, | Performed by: SURGERY

## 2024-10-22 PROCEDURE — 99213 OFFICE O/P EST LOW 20 MIN: CPT | Mod: PBBFAC | Performed by: SURGERY

## 2024-10-22 PROCEDURE — 99999 PR PBB SHADOW E&M-EST. PATIENT-LVL III: CPT | Mod: PBBFAC,,, | Performed by: SURGERY

## 2024-10-22 PROCEDURE — 3074F SYST BP LT 130 MM HG: CPT | Mod: CPTII,,, | Performed by: SURGERY

## 2024-10-22 PROCEDURE — 1159F MED LIST DOCD IN RCRD: CPT | Mod: CPTII,,, | Performed by: SURGERY

## 2024-10-22 NOTE — PROGRESS NOTES
38 y/o woman with history of lap robo kia, now about 2 weeks out.  No complaints this am, reports feeling well.    PE: incision c/d/i no evidence of infection or hernia    Impression: post op, doing well    Plan: gradually resume normal activity, normal diet, and follow up as needed.

## 2024-10-23 ENCOUNTER — TELEPHONE (OUTPATIENT)
Dept: SLEEP MEDICINE | Facility: CLINIC | Age: 39
End: 2024-10-23
Payer: MEDICAID

## 2024-10-23 NOTE — TELEPHONE ENCOUNTER
Spoke with patient to schedule an appointment with Kaylie Camp. Appointment set for December 5,2024 at 1pm virtual. Referral in chart.

## 2024-11-22 ENCOUNTER — PATIENT MESSAGE (OUTPATIENT)
Dept: RESEARCH | Facility: HOSPITAL | Age: 39
End: 2024-11-22
Payer: MEDICAID

## (undated) DEVICE — GOWN NONREINF SET-IN SLV XL

## (undated) DEVICE — ELECTRODE REM PLYHSV RETURN 9

## (undated) DEVICE — SOL CLEARIFY VISUALIZATION LAP

## (undated) DEVICE — PAD PINK TRENDELENBURG POS XL

## (undated) DEVICE — HOOK PERM MONOPOLAR CAUTERY

## (undated) DEVICE — DRAPE ARM DAVINCI XI

## (undated) DEVICE — APPLIER MEDIUM LARGE CLIP

## (undated) DEVICE — BLANKET UPPER BODY 78.7X29.9IN

## (undated) DEVICE — POSITIONER HEAD DONUT 9IN FOAM

## (undated) DEVICE — Device

## (undated) DEVICE — GLOVE SIGNATURE ESSNTL LTX 7.5

## (undated) DEVICE — OBTURATOR BLADELESS 8MM XI CLR

## (undated) DEVICE — PACK ENDOSCOPY GENERAL

## (undated) DEVICE — TUBING INSUFFLATION W/LUER LOK

## (undated) DEVICE — SYR ONLY LUER LOCK 20CC

## (undated) DEVICE — NDL HYPO REG 25G X 1 1/2

## (undated) DEVICE — COVER LIGHT HANDLE

## (undated) DEVICE — SUT VICRYL 4-0 ANTIBACT

## (undated) DEVICE — DRESSING ADH ISLAND 2.5 X 3

## (undated) DEVICE — SEAL UNIVERSAL 5MM-8MM XI

## (undated) DEVICE — DRAPE COLUMN DAVINCI XI

## (undated) DEVICE — NDL ECLIPSE SAFETY 18GX1-1/2IN

## (undated) DEVICE — CLOSURE SKIN STERI STRIP 1/2X4

## (undated) DEVICE — HEMOCLIPS GREEN

## (undated) DEVICE — SYR 10CC LUER LOCK

## (undated) DEVICE — DRAPE THREE-QUARTER 53X77IN

## (undated) DEVICE — CHLORAPREP W TINT 26ML APPL

## (undated) DEVICE — SOL IRR SOD CHL .9% POUR

## (undated) DEVICE — SUT VICRYL+ 27 UR-6 VIOL

## (undated) DEVICE — NDL INSUF ULTRA VERESS 120MM